# Patient Record
Sex: FEMALE | Race: WHITE
[De-identification: names, ages, dates, MRNs, and addresses within clinical notes are randomized per-mention and may not be internally consistent; named-entity substitution may affect disease eponyms.]

---

## 2019-08-02 ENCOUNTER — HOSPITAL ENCOUNTER (OUTPATIENT)
Age: 69
Discharge: HOME | End: 2019-08-02
Payer: MEDICARE

## 2019-08-02 DIAGNOSIS — Z53.9: Primary | ICD-10-CM

## 2020-05-13 ENCOUNTER — HOSPITAL ENCOUNTER (OUTPATIENT)
Dept: HOSPITAL 76 - EMS | Age: 70
Discharge: ANOTHER HOSPITAL | End: 2020-05-13
Attending: SURGERY
Payer: MEDICARE

## 2020-05-13 DIAGNOSIS — R51: Primary | ICD-10-CM

## 2020-05-13 DIAGNOSIS — H53.8: ICD-10-CM

## 2020-07-09 ENCOUNTER — HOSPITAL ENCOUNTER (OUTPATIENT)
Dept: HOSPITAL 76 - LAB.R | Age: 70
Discharge: HOME | End: 2020-07-09
Attending: NURSE PRACTITIONER
Payer: MEDICARE

## 2020-07-09 DIAGNOSIS — N39.0: Primary | ICD-10-CM

## 2020-07-09 PROCEDURE — 87181 SC STD AGAR DILUTION PER AGT: CPT

## 2020-07-09 PROCEDURE — 87077 CULTURE AEROBIC IDENTIFY: CPT

## 2020-07-09 PROCEDURE — 87086 URINE CULTURE/COLONY COUNT: CPT

## 2020-12-18 ENCOUNTER — HOSPITAL ENCOUNTER (OUTPATIENT)
Dept: HOSPITAL 76 - LAB.S | Age: 70
Discharge: HOME | End: 2020-12-18
Attending: EMERGENCY MEDICINE
Payer: MEDICARE

## 2020-12-18 DIAGNOSIS — R30.0: Primary | ICD-10-CM

## 2020-12-18 PROCEDURE — 87086 URINE CULTURE/COLONY COUNT: CPT

## 2020-12-24 ENCOUNTER — HOSPITAL ENCOUNTER (EMERGENCY)
Dept: HOSPITAL 76 - ED | Age: 70
Discharge: HOME | End: 2020-12-24
Payer: MEDICARE

## 2020-12-24 VITALS — SYSTOLIC BLOOD PRESSURE: 157 MMHG | DIASTOLIC BLOOD PRESSURE: 81 MMHG

## 2020-12-24 DIAGNOSIS — F17.200: ICD-10-CM

## 2020-12-24 DIAGNOSIS — I25.2: ICD-10-CM

## 2020-12-24 DIAGNOSIS — H54.3: Primary | ICD-10-CM

## 2020-12-24 DIAGNOSIS — I10: ICD-10-CM

## 2020-12-24 LAB
ANION GAP SERPL CALCULATED.4IONS-SCNC: 10 MMOL/L (ref 6–13)
BASOPHILS NFR BLD AUTO: 0 10^3/UL (ref 0–0.1)
BASOPHILS NFR BLD AUTO: 0.6 %
BUN SERPL-MCNC: 22 MG/DL (ref 6–20)
CALCIUM UR-MCNC: 9.3 MG/DL (ref 8.5–10.3)
CHLORIDE SERPL-SCNC: 100 MMOL/L (ref 101–111)
CO2 SERPL-SCNC: 26 MMOL/L (ref 21–32)
CREAT SERPLBLD-SCNC: 0.7 MG/DL (ref 0.4–1)
EOSINOPHIL # BLD AUTO: 0.1 10^3/UL (ref 0–0.7)
EOSINOPHIL NFR BLD AUTO: 2.6 %
ERYTHROCYTE [DISTWIDTH] IN BLOOD BY AUTOMATED COUNT: 13.7 % (ref 12–15)
GLUCOSE SERPL-MCNC: 98 MG/DL (ref 70–100)
HGB UR QL STRIP: 12 G/DL (ref 12–16)
LYMPHOCYTES # SPEC AUTO: 1.9 10^3/UL (ref 1.5–3.5)
LYMPHOCYTES NFR BLD AUTO: 34.2 %
MCH RBC QN AUTO: 33.6 PG (ref 27–31)
MCHC RBC AUTO-ENTMCNC: 33.1 G/DL (ref 32–36)
MCV RBC AUTO: 101.4 FL (ref 81–99)
MONOCYTES # BLD AUTO: 0.5 10^3/UL (ref 0–1)
MONOCYTES NFR BLD AUTO: 8.5 %
NEUTROPHILS # BLD AUTO: 2.9 10^3/UL (ref 1.5–6.6)
NEUTROPHILS # SNV AUTO: 5.4 X10^3/UL (ref 4.8–10.8)
NEUTROPHILS NFR BLD AUTO: 53.9 %
PDW BLD AUTO: 9.2 FL (ref 7.9–10.8)
PLATELET # BLD: 255 10^3/UL (ref 130–450)
RBC MAR: 3.57 10^6/UL (ref 4.2–5.4)
SODIUM SERPLBLD-SCNC: 136 MMOL/L (ref 135–145)

## 2020-12-24 PROCEDURE — 80048 BASIC METABOLIC PNL TOTAL CA: CPT

## 2020-12-24 PROCEDURE — 99284 EMERGENCY DEPT VISIT MOD MDM: CPT

## 2020-12-24 PROCEDURE — 85025 COMPLETE CBC W/AUTO DIFF WBC: CPT

## 2020-12-24 PROCEDURE — 36415 COLL VENOUS BLD VENIPUNCTURE: CPT

## 2020-12-24 PROCEDURE — 70496 CT ANGIOGRAPHY HEAD: CPT

## 2020-12-24 PROCEDURE — 93880 EXTRACRANIAL BILAT STUDY: CPT

## 2020-12-24 PROCEDURE — 99281 EMR DPT VST MAYX REQ PHY/QHP: CPT

## 2020-12-24 NOTE — CT REPORT
PROCEDURE:  ANGIO HEAD W/WO

 

INDICATIONS:  transient vision loss

 

CONTRAST:  IV CONTRAST: Optiray 320 ml: 80 PO CONTRAST: *NO PO CONTRAST 

 

TECHNIQUE:  

Precontrast 4.5 mm thick angled axial sections acquired from the foramen magnum to the vertex.   Afte
r the administration of intravenous contrast, 1 mm thick sections acquired through the [place]
is.  Postcontrast 4.5 mm thick sections then re-acquired from the foramen magnum to the vertex.  3-di
mensional maximum-intensity-projection (MIP) and/or volume rendering reformats were acquired of the c
entral intracranial vasculature.  For radiation dose reduction, the following was used:  automated ex
posure control, adjustment of mA and/or kV according to patient size.

 

COMPARISON:  CT angiogram of the head dated 5/13/2020.

 

FINDINGS:  

Image quality:  Excellent.  

 

Anterior circulation: The inferior intracranial internal carotid arteries are normal in size and flow
. Atheromatous calcifications are present bilaterally within the cavernous portions of the internal c
arotid arteries with resultant moderate stenosis.  The flow within the paired anterior cerebral arter
ies is normal and symmetric.  The flow within the middle cerebral arteries is normal and symmetric.  
The anterior communicating artery is not seen.  No aneurysms are seen.  

 

Posterior circulation:  Visualized portions of the vertebral arteries demonstrate normal caliber, and
 join to form a normal appearing basilar artery.  Flow within the posterior cerebral arteries is norm
al and symmetric.  No aneurysms are seen.  

 

CSF spaces:  Ventricles are normal in size and shape.  Basal cisterns are patent.  No extra-axial flu
id collections.  

 

Brain:  No midline shift. Encephalomalacia is redemonstrated within the right occipital lobe unchange
d from the CT dated 5/13/2020. No intracranial bleeds or masses.  Gray-white matter interface appears
 intact.  

 

Skull and face:  Calvarium and facial bones appear intact, without suspicious lesions.  

 

Sinuses:  Visualized sinuses and mastoids are clear.  

 

IMPRESSION:  

 

1. No acute intercranial findings.

2. Old right PCA distribution infarct, unchanged and the study dated 5/13/2020.

3. No new stenosis, occlusion, or aneurysm.

 

Reviewed by: Milagros Chang MD on 12/24/2020 2:57 PM PST

Approved by: Milagros Chang MD on 12/24/2020 2:57 PM PST

 

 

Station ID:  [id number]
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
## 2020-12-24 NOTE — ULTRASOUND REPORT
PROCEDURE:  Carotid Doppler Complete

 

INDICATIONS:  r/o carotid stenosis. possible TIA

 

TECHNIQUE:  

Color and pulse Doppler interrogation was performed of both carotid systems, with image documentation
 and velocity measurements.  

 

COMPARISON:  11/27/2018

 

FINDINGS:     

 

Right side:  

 

Common carotid artery peak systolic velocity:  71 cm/sec.  

Internal carotid artery peak systolic velocity:  99 cm/sec.  

Internal carotid artery end diastolic velocity:  32 cm/sec.  

External carotid artery peak systolic velocity:  94 cm/sec.  

ICA/CCA peak systolic ratio:  1.4 .  

Gray scale imaging description:  Intimal medial thickening of the common carotid artery. Mixed calcif
ied and noncalcified plaque at the right carotid bulb causing a minor subjective luminal ICA stenosis
. Mild spectral broadening of the distal waveform in the ICA.

Percent internal carotid artery stenosis:  Less than 50%. .  

Vertebral artery:  Flow direction is antegrade.  

 

Left side:  

 

Common carotid artery peak systolic velocity:  61 cm/sec.  

Internal carotid artery peak systolic velocity:  86 cm/sec.  

Internal carotid artery end diastolic velocity:  33 cm/sec.  

External carotid artery peak systolic velocity:  88 cm/sec.  

ICA/CCA peak systolic ratio:  1.4 .  

Gray scale imaging description:  Shadowing calcific plaque left carotid bulb causing mild to moderate
 subjective luminal stenosis and luminal irregularity. There is intimal medial thickening in the comm
on carotid artery. Calcific plaque seen in the proximal internal carotid artery causing mild subjecti
ve luminal narrowing. Vascular tortuosity results and waveform spectral broadening. 

Percent internal carotid artery stenosis:  Less than 50% .

Vertebral artery:  Flow direction is antegrade.  

 

IMPRESSION:  

 

1. Less than 50% internal carotid artery stenosis bilaterally based on waveform and velocity criteria
.

2. Moderate calcific plaque in the left carotid bulb and left ICA origin.

3. Bilateral intimal medial thickening of the common carotid arteries.

 

The estimate of stenosis included in the report of the imaging study was calculated using the NASCET 
method

 

Reviewed by: Brianna Andrews MD on 12/24/2020 5:00 PM PST

Approved by: Brianna Andrews MD on 12/24/2020 5:00 PM PST

 

 

Station ID:  [id number]

## 2020-12-28 ENCOUNTER — HOSPITAL ENCOUNTER (OUTPATIENT)
Dept: HOSPITAL 76 - LAB.N | Age: 70
Discharge: HOME | End: 2020-12-28
Attending: PHYSICIAN ASSISTANT
Payer: MEDICARE

## 2020-12-28 DIAGNOSIS — A09: Primary | ICD-10-CM

## 2020-12-28 DIAGNOSIS — Z20.828: ICD-10-CM

## 2021-01-04 ENCOUNTER — HOSPITAL ENCOUNTER (EMERGENCY)
Dept: HOSPITAL 76 - ED | Age: 71
Discharge: HOME | End: 2021-01-04
Payer: MEDICARE

## 2021-01-04 ENCOUNTER — HOSPITAL ENCOUNTER (OUTPATIENT)
Dept: HOSPITAL 76 - EMS | Age: 71
Discharge: ANOTHER HOSPITAL | End: 2021-01-04
Attending: SURGERY
Payer: MEDICARE

## 2021-01-04 VITALS — SYSTOLIC BLOOD PRESSURE: 119 MMHG | DIASTOLIC BLOOD PRESSURE: 55 MMHG

## 2021-01-04 DIAGNOSIS — J44.9: ICD-10-CM

## 2021-01-04 DIAGNOSIS — R42: Primary | ICD-10-CM

## 2021-01-04 DIAGNOSIS — H53.8: ICD-10-CM

## 2021-01-04 DIAGNOSIS — R11.0: ICD-10-CM

## 2021-01-04 DIAGNOSIS — I10: ICD-10-CM

## 2021-01-04 DIAGNOSIS — F17.200: ICD-10-CM

## 2021-01-04 DIAGNOSIS — R51.9: ICD-10-CM

## 2021-01-04 DIAGNOSIS — R41.0: Primary | ICD-10-CM

## 2021-01-04 DIAGNOSIS — Z86.73: ICD-10-CM

## 2021-01-04 LAB
ALBUMIN DIAFP-MCNC: 4 G/DL (ref 3.2–5.5)
ALBUMIN/GLOB SERPL: 1.3 {RATIO} (ref 1–2.2)
ALP SERPL-CCNC: 64 IU/L (ref 42–121)
ALT SERPL W P-5'-P-CCNC: 15 IU/L (ref 10–60)
AMPHET UR QL SCN: NEGATIVE
ANION GAP SERPL CALCULATED.4IONS-SCNC: 12 MMOL/L (ref 6–13)
AST SERPL W P-5'-P-CCNC: 20 IU/L (ref 10–42)
BASOPHILS NFR BLD AUTO: 0.1 10^3/UL (ref 0–0.1)
BASOPHILS NFR BLD AUTO: 0.4 %
BENZODIAZ UR QL SCN: NEGATIVE
BILIRUB BLD-MCNC: 0.7 MG/DL (ref 0.2–1)
BILIRUB UR QL CFM: NEGATIVE
BUN SERPL-MCNC: 13 MG/DL (ref 6–20)
CALCIUM UR-MCNC: 8.8 MG/DL (ref 8.5–10.3)
CASTS URNS QL MICRO: (no result) /LPF
CHLORIDE SERPL-SCNC: 95 MMOL/L (ref 101–111)
CLARITY UR REFRACT.AUTO: CLEAR
CO2 SERPL-SCNC: 24 MMOL/L (ref 21–32)
COCAINE UR-SCNC: NEGATIVE UMOL/L
CREAT SERPLBLD-SCNC: 0.7 MG/DL (ref 0.4–1)
EOSINOPHIL # BLD AUTO: 0.1 10^3/UL (ref 0–0.7)
EOSINOPHIL NFR BLD AUTO: 0.5 %
ERYTHROCYTE [DISTWIDTH] IN BLOOD BY AUTOMATED COUNT: 13.3 % (ref 12–15)
GLOBULIN SER-MCNC: 3.1 G/DL (ref 2.1–4.2)
GLUCOSE SERPL-MCNC: 110 MG/DL (ref 70–100)
GLUCOSE UR QL STRIP.AUTO: 100 MG/DL
HGB UR QL STRIP: 12.1 G/DL (ref 12–16)
KETONES UR QL STRIP.AUTO: (no result) MG/DL
LIPASE SERPL-CCNC: 32 U/L (ref 22–51)
LYMPHOCYTES # SPEC AUTO: 1.1 10^3/UL (ref 1.5–3.5)
LYMPHOCYTES NFR BLD AUTO: 8.8 %
MCH RBC QN AUTO: 34 PG (ref 27–31)
MCHC RBC AUTO-ENTMCNC: 34.3 G/DL (ref 32–36)
MCV RBC AUTO: 99.2 FL (ref 81–99)
METHADONE UR QL SCN: NEGATIVE
METHAMPHET UR QL SCN: NEGATIVE
MONOCYTES # BLD AUTO: 0.9 10^3/UL (ref 0–1)
MONOCYTES NFR BLD AUTO: 7.1 %
NEUTROPHILS # BLD AUTO: 10.4 10^3/UL (ref 1.5–6.6)
NEUTROPHILS # SNV AUTO: 12.6 X10^3/UL (ref 4.8–10.8)
NEUTROPHILS NFR BLD AUTO: 82.6 %
NITRITE UR QL STRIP.AUTO: NEGATIVE
OPIATES UR QL SCN: NEGATIVE
PDW BLD AUTO: 9.2 FL (ref 7.9–10.8)
PH UR STRIP.AUTO: 5.5 PH (ref 5–7.5)
PLATELET # BLD: 314 10^3/UL (ref 130–450)
PROT SPEC-MCNC: 7.1 G/DL (ref 6.7–8.2)
PROT UR STRIP.AUTO-MCNC: (no result) MG/DL
RBC # UR STRIP.AUTO: NEGATIVE /UL
RBC # URNS HPF: (no result) /HPF (ref 0–5)
RBC MAR: 3.56 10^6/UL (ref 4.2–5.4)
SODIUM SERPLBLD-SCNC: 131 MMOL/L (ref 135–145)
SP GR UR STRIP.AUTO: 1.02 (ref 1–1.03)
SQUAMOUS URNS QL MICRO: (no result)
UROBILINOGEN UR QL STRIP.AUTO: (no result) E.U./DL
UROBILINOGEN UR STRIP.AUTO-MCNC: NEGATIVE MG/DL
VOLATILE DRUGS POS SERPL SCN: (no result)

## 2021-01-04 PROCEDURE — 84484 ASSAY OF TROPONIN QUANT: CPT

## 2021-01-04 PROCEDURE — 36415 COLL VENOUS BLD VENIPUNCTURE: CPT

## 2021-01-04 PROCEDURE — 93005 ELECTROCARDIOGRAM TRACING: CPT

## 2021-01-04 PROCEDURE — 80320 DRUG SCREEN QUANTALCOHOLS: CPT

## 2021-01-04 PROCEDURE — 99284 EMERGENCY DEPT VISIT MOD MDM: CPT

## 2021-01-04 PROCEDURE — 99283 EMERGENCY DEPT VISIT LOW MDM: CPT

## 2021-01-04 PROCEDURE — 80053 COMPREHEN METABOLIC PANEL: CPT

## 2021-01-04 PROCEDURE — 96375 TX/PRO/DX INJ NEW DRUG ADDON: CPT

## 2021-01-04 PROCEDURE — 96374 THER/PROPH/DIAG INJ IV PUSH: CPT

## 2021-01-04 PROCEDURE — 71045 X-RAY EXAM CHEST 1 VIEW: CPT

## 2021-01-04 PROCEDURE — 80306 DRUG TEST PRSMV INSTRMNT: CPT

## 2021-01-04 PROCEDURE — 81001 URINALYSIS AUTO W/SCOPE: CPT

## 2021-01-04 PROCEDURE — 83690 ASSAY OF LIPASE: CPT

## 2021-01-04 PROCEDURE — 70450 CT HEAD/BRAIN W/O DYE: CPT

## 2021-01-04 PROCEDURE — 85025 COMPLETE CBC W/AUTO DIFF WBC: CPT
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
## 2021-01-04 NOTE — XRAY REPORT
PROCEDURE:  Chest 1 View X-Ray

 

INDICATIONS:  Chest Pain

 

TECHNIQUE:  One view of the chest was acquired.  

 

COMPARISON:  3/20/2020

 

FINDINGS:  

 

Surgical changes and devices:  None.  

 

Lungs and pleura:  No pleural effusions or pneumothorax.  Lungs are clear.  

 

Mediastinum:  Mediastinal contours appear normal.  Heart size is normal.  

 

Bones and chest wall:  No suspicious bony lesions.  Overlying soft tissues appear unremarkable.  

 

IMPRESSION:  

No acute cardiopulmonary pathology.

 

Reviewed by: Danilo Woodward MD on 1/4/2021 9:20 AM PST

Approved by: Danilo Woodward MD on 1/4/2021 9:20 AM [facility]

 

 

Station ID:  [id number]

## 2021-01-12 ENCOUNTER — HOSPITAL ENCOUNTER (OUTPATIENT)
Dept: HOSPITAL 76 - LAB.R | Age: 71
Discharge: HOME | End: 2021-01-12
Attending: FAMILY MEDICINE
Payer: MEDICARE

## 2021-01-12 DIAGNOSIS — R19.4: Primary | ICD-10-CM

## 2021-01-12 PROCEDURE — 87493 C DIFF AMPLIFIED PROBE: CPT

## 2021-01-12 PROCEDURE — 87427 SHIGA-LIKE TOXIN AG IA: CPT

## 2021-01-12 PROCEDURE — 87329 GIARDIA AG IA: CPT

## 2021-01-12 PROCEDURE — 87209 SMEAR COMPLEX STAIN: CPT

## 2021-01-12 PROCEDURE — 87177 OVA AND PARASITES SMEARS: CPT

## 2021-01-12 PROCEDURE — 87046 STOOL CULTR AEROBIC BACT EA: CPT

## 2021-01-12 PROCEDURE — 87338 HPYLORI STOOL AG IA: CPT

## 2021-01-12 PROCEDURE — 82274 ASSAY TEST FOR BLOOD FECAL: CPT

## 2021-01-12 PROCEDURE — 83993 ASSAY FOR CALPROTECTIN FECAL: CPT

## 2021-01-12 PROCEDURE — 81599 UNLISTED MAAA: CPT

## 2021-01-12 PROCEDURE — 87045 FECES CULTURE AEROBIC BACT: CPT

## 2021-03-07 ENCOUNTER — HOSPITAL ENCOUNTER (OUTPATIENT)
Dept: HOSPITAL 76 - LAB.N | Age: 71
Discharge: HOME | End: 2021-03-07
Attending: PHYSICIAN ASSISTANT
Payer: MEDICARE

## 2021-03-07 DIAGNOSIS — N39.0: Primary | ICD-10-CM

## 2021-03-07 PROCEDURE — 87077 CULTURE AEROBIC IDENTIFY: CPT

## 2021-03-07 PROCEDURE — 87086 URINE CULTURE/COLONY COUNT: CPT

## 2021-03-07 PROCEDURE — 87181 SC STD AGAR DILUTION PER AGT: CPT

## 2021-03-21 ENCOUNTER — HOSPITAL ENCOUNTER (OUTPATIENT)
Dept: HOSPITAL 76 - EMS | Age: 71
Discharge: ANOTHER HOSPITAL | End: 2021-03-21
Attending: EMERGENCY MEDICINE
Payer: MEDICARE

## 2021-03-21 ENCOUNTER — HOSPITAL ENCOUNTER (EMERGENCY)
Dept: HOSPITAL 76 - ED | Age: 71
Discharge: HOME | End: 2021-03-21
Payer: MEDICARE

## 2021-03-21 VITALS — DIASTOLIC BLOOD PRESSURE: 58 MMHG | SYSTOLIC BLOOD PRESSURE: 130 MMHG

## 2021-03-21 DIAGNOSIS — H53.8: ICD-10-CM

## 2021-03-21 DIAGNOSIS — R51.9: ICD-10-CM

## 2021-03-21 DIAGNOSIS — F17.200: ICD-10-CM

## 2021-03-21 DIAGNOSIS — R11.0: Primary | ICD-10-CM

## 2021-03-21 DIAGNOSIS — G40.89: Primary | ICD-10-CM

## 2021-03-21 LAB
ALBUMIN DIAFP-MCNC: 4 G/DL (ref 3.2–5.5)
ALBUMIN/GLOB SERPL: 1.7 {RATIO} (ref 1–2.2)
ALP SERPL-CCNC: 45 IU/L (ref 42–121)
ALT SERPL W P-5'-P-CCNC: 18 IU/L (ref 10–60)
AMPHET UR QL SCN: NEGATIVE
ANION GAP SERPL CALCULATED.4IONS-SCNC: 16 MMOL/L (ref 6–13)
AST SERPL W P-5'-P-CCNC: 26 IU/L (ref 10–42)
BARBITURATES UR QL SCN>300 NG/ML: NEGATIVE
BASOPHILS NFR BLD AUTO: 0 10^3/UL (ref 0–0.1)
BASOPHILS NFR BLD AUTO: 0.5 %
BENZODIAZ UR QL SCN: NEGATIVE
BILIRUB BLD-MCNC: 0.6 MG/DL (ref 0.2–1)
BUN SERPL-MCNC: 16 MG/DL (ref 6–20)
CALCIUM UR-MCNC: 8.9 MG/DL (ref 8.5–10.3)
CHLORIDE SERPL-SCNC: 97 MMOL/L (ref 101–111)
CLARITY UR REFRACT.AUTO: CLEAR
CO2 SERPL-SCNC: 21 MMOL/L (ref 21–32)
COCAINE UR-SCNC: NEGATIVE UMOL/L
CREAT SERPLBLD-SCNC: 0.9 MG/DL (ref 0.4–1)
EOSINOPHIL # BLD AUTO: 0.1 10^3/UL (ref 0–0.7)
EOSINOPHIL NFR BLD AUTO: 0.9 %
ERYTHROCYTE [DISTWIDTH] IN BLOOD BY AUTOMATED COUNT: 13.2 % (ref 12–15)
ETHANOL BLD-MCNC: < 5 MG/DL
GFRSERPLBLD MDRD-ARVRAT: 62 ML/MIN/{1.73_M2} (ref 89–?)
GLOBULIN SER-MCNC: 2.3 G/DL (ref 2.1–4.2)
GLUCOSE SERPL-MCNC: 133 MG/DL (ref 70–100)
GLUCOSE UR QL STRIP.AUTO: NEGATIVE MG/DL
HCT VFR BLD AUTO: 34.4 % (ref 37–47)
HGB UR QL STRIP: 11.7 G/DL (ref 12–16)
KETONES UR QL STRIP.AUTO: (no result) MG/DL
LIPASE SERPL-CCNC: 43 U/L (ref 22–51)
LYMPHOCYTES # SPEC AUTO: 1.2 10^3/UL (ref 1.5–3.5)
LYMPHOCYTES NFR BLD AUTO: 16.4 %
MCH RBC QN AUTO: 34.4 PG (ref 27–31)
MCHC RBC AUTO-ENTMCNC: 34 G/DL (ref 32–36)
MCV RBC AUTO: 101.2 FL (ref 81–99)
METHADONE UR QL SCN: NEGATIVE
METHAMPHET UR QL SCN: NEGATIVE
MONOCYTES # BLD AUTO: 0.5 10^3/UL (ref 0–1)
MONOCYTES NFR BLD AUTO: 6.6 %
NEUTROPHILS # BLD AUTO: 5.6 10^3/UL (ref 1.5–6.6)
NEUTROPHILS # SNV AUTO: 7.4 X10^3/UL (ref 4.8–10.8)
NEUTROPHILS NFR BLD AUTO: 75.5 %
NITRITE UR QL STRIP.AUTO: NEGATIVE
NRBC # BLD AUTO: 0 /100WBC
NRBC # BLD AUTO: 0 X10^3/UL
OPIATES UR QL SCN: NEGATIVE
PDW BLD AUTO: 8.5 FL (ref 7.9–10.8)
PH UR STRIP.AUTO: 5.5 PH (ref 5–7.5)
PLATELET # BLD: 241 10^3/UL (ref 130–450)
POTASSIUM SERPL-SCNC: 4.1 MMOL/L (ref 3.5–5)
PROT SPEC-MCNC: 6.3 G/DL (ref 6.7–8.2)
PROT UR STRIP.AUTO-MCNC: (no result) MG/DL
RBC # UR STRIP.AUTO: NEGATIVE /UL
RBC MAR: 3.4 10^6/UL (ref 4.2–5.4)
SODIUM SERPLBLD-SCNC: 134 MMOL/L (ref 135–145)
SP GR UR STRIP.AUTO: 1.02 (ref 1–1.03)
THC UR QL SCN: NEGATIVE
UROBILINOGEN UR QL STRIP.AUTO: (no result) E.U./DL
UROBILINOGEN UR STRIP.AUTO-MCNC: NEGATIVE MG/DL
VOLATILE DRUGS POS SERPL SCN: (no result)

## 2021-03-21 PROCEDURE — 36415 COLL VENOUS BLD VENIPUNCTURE: CPT

## 2021-03-21 PROCEDURE — 96365 THER/PROPH/DIAG IV INF INIT: CPT

## 2021-03-21 PROCEDURE — 81003 URINALYSIS AUTO W/O SCOPE: CPT

## 2021-03-21 PROCEDURE — 70450 CT HEAD/BRAIN W/O DYE: CPT

## 2021-03-21 PROCEDURE — 85025 COMPLETE CBC W/AUTO DIFF WBC: CPT

## 2021-03-21 PROCEDURE — 80320 DRUG SCREEN QUANTALCOHOLS: CPT

## 2021-03-21 PROCEDURE — 80306 DRUG TEST PRSMV INSTRMNT: CPT

## 2021-03-21 PROCEDURE — 81001 URINALYSIS AUTO W/SCOPE: CPT

## 2021-03-21 PROCEDURE — 99281 EMR DPT VST MAYX REQ PHY/QHP: CPT

## 2021-03-21 PROCEDURE — 99284 EMERGENCY DEPT VISIT MOD MDM: CPT

## 2021-03-21 PROCEDURE — 80053 COMPREHEN METABOLIC PANEL: CPT

## 2021-03-21 PROCEDURE — 83690 ASSAY OF LIPASE: CPT

## 2021-03-21 PROCEDURE — 87086 URINE CULTURE/COLONY COUNT: CPT

## 2021-03-21 NOTE — ED PHYSICIAN DOCUMENTATION
History of Present Illness





- Stated complaint


Stated Complaint: VISUAL CHANGES





- History obtained from


History obtained from: Patient, EMS





- History of Present Illness


Timing: Today


Pain level max: 6


Pain level now: 6





- Additonal information


Additional information: 





Patient is a 70-year-old female who presents to the emergency department with 

EMS today.  She reportedly was having flashing spots in her vision and a 

headache earlier.  This is typical for her.  She has been worked up for 

migraines versus seizures with neurology at the [facility]. She is not 

currently on any antiepileptics.  Does have encephalomalacia from a prior CVA.  

No recent medication changes.  She does smoke.  Unknown if she drinks alcohol or

not.  Patient was seizing upon arrival to the emergency department.  Seizure 

lasted about 2 to 3 minutes.  Full body tonic-clonic.  Bit her lip.





Review of Systems


Unable to obtain: Confused (post ictal)





PD PAST MEDICAL HISTORY





- Past Medical History


Respiratory: COPD


Neuro: CVA


Endocrine/Autoimmune: None


GI: Diverticulitis


GYN: None


: None


HEENT: Other


Psych: None


Musculoskeletal: None


Derm: None





- Past Surgical History


Past Surgical History: Yes


General: Appendectomy


/GYN:  section


HEENT: Cataracts, Tonsil/Adenoidectomy





- Present Medications


Home Medications: 


                                Ambulatory Orders











 Medication  Instructions  Recorded  Confirmed


 


Ipratropium/Albuterol [Duoneb] 1 puffs PO BID 18


 


Atorvastatin [Lipitor] 80 mg PO DAILY 20


 


Lisinopril [Prinivil] 10 mg PO BID 20


 


Metoprolol Succinate [Toprol Xl] 50 mg PO DAILY 20


 


Levetiracetam [Keppra] 500 mg PO BID #60 tablet 21 














- Allergies


Allergies/Adverse Reactions: 


                                    Allergies











Allergy/AdvReac Type Severity Reaction Status Date / Time


 


doxycycline Allergy  Unknown Verified 21 13:27


 


erythromycin base Allergy  Unknown Verified 21 13:27














- Social History


Does the pt smoke?: Yes


Smoking Status: Current every day smoker


Does the pt drink ETOH?: Yes


Does the pt have substance abuse?: No





- Immunizations


Immunizations are current?: Yes





- POLST


Patient has POLST: No





PD ED PE NORMAL





- Vitals


Vital signs reviewed: Yes





- General


General: No acute distress, Well developed/nourished, Other (post ictal, 

confused)





- HEENT


HEENT: Atraumatic, PERRL, Moist mucous membranes, Other (bit lower left lip, 

inner surface)





- Neck


Neck: Supple, no meningeal sign, No bony TTP





- Cardiac


Cardiac: RRR





- Respiratory


Respiratory: No respiratory distress, Clear bilaterally





- Abdomen


Abdomen: Soft, Non tender, Non distended





- Derm


Derm: Warm and dry





- Extremities


Extremities: No edema





- Neuro


Neuro: Other (post ictal, drowsy, but arousable and confused)





Results





- Vitals


Vitals: 


                               Vital Signs - 24 hr











  21





  13:19 14:49 16:09


 


Temperature 36.2 C L  


 


Heart Rate 110 H 77 84


 


Respiratory 16 17 16





Rate   


 


Blood Pressure 192/91 H 126/59 L 130/58 L


 


O2 Saturation 92 100 100














  21





  16:19


 


Temperature 


 


Heart Rate 82


 


Respiratory 17





Rate 


 


Blood Pressure 130/58 L


 


O2 Saturation 98








                                     Oxygen











O2 Source                      Room air

















- Labs


Labs: 


                                Laboratory Tests











  21





  13:25 14:06 15:40


 


WBC  7.4  


 


RBC  3.40 L  


 


Hgb  11.7 L  


 


Hct  34.4 L  


 


MCV  101.2 H  


 


MCH  34.4 H  


 


MCHC  34.0  


 


RDW  13.2  


 


Plt Count  241  


 


MPV  8.5  


 


Neut # (Auto)  5.6  


 


Lymph # (Auto)  1.2 L  


 


Mono # (Auto)  0.5  


 


Eos # (Auto)  0.1  


 


Baso # (Auto)  0.0  


 


Absolute Nucleated RBC  0.00  


 


Nucleated RBC %  0.0  


 


Sodium   134 L 


 


Potassium   4.1 


 


Chloride   97 L 


 


Carbon Dioxide   21 


 


Anion Gap   16.0 H 


 


BUN   16 


 


Creatinine   0.9 


 


Estimated GFR (MDRD)   62 L 


 


Glucose   133 H 


 


Calcium   8.9 


 


Total Bilirubin   0.6 


 


AST   26 


 


ALT   18 


 


Alkaline Phosphatase   45 


 


Total Protein   6.3 L 


 


Albumin   4.0 


 


Globulin   2.3 


 


Albumin/Globulin Ratio   1.7 


 


Lipase   43 


 


Urine Color    YELLOW


 


Urine Clarity    CLEAR


 


Urine pH    5.5


 


Ur Specific Gravity    1.025


 


Urine Protein    TRACE


 


Urine Glucose (UA)    NEGATIVE


 


Urine Ketones    TRACE


 


Urine Occult Blood    NEGATIVE


 


Urine Nitrite    NEGATIVE


 


Urine Bilirubin    NEGATIVE


 


Urine Urobilinogen    0.2 (NORMAL)


 


Ur Leukocyte Esterase    NEGATIVE


 


Ur Microscopic Review    NOT INDICATED


 


Urine Culture Comments    NOT INDICATED


 


Urine Opiates Screen    NEGATIVE


 


Ur Oxycodone Screen    NEGATIVE


 


Urine Methadone Screen    NEGATIVE


 


Ur Propoxyphene Screen    NEGATIVE


 


Ur Barbiturates Screen    NEGATIVE


 


Ur Tricyclics Screen    NEGATIVE


 


Ur Phencyclidine Scrn    NEGATIVE


 


Ur Amphetamine Screen    NEGATIVE


 


U Methamphetamines Scrn    NEGATIVE


 


U Benzodiazepines Scrn    NEGATIVE


 


Urine Cocaine Screen    NEGATIVE


 


U Cannabinoids Screen    NEGATIVE


 


Ethyl Alcohol   < 5.0 














- Rads (name of study)


  ** head CT


Radiology: Prelim report reviewed, EMP read contemporaneously, See rad report





PD MEDICAL DECISION MAKING





- ED course


Complexity details: reviewed old records, reviewed results, re-evaluated 

patient, considered differential, d/w patient


ED course: 





70-year-old female with a generalized tonic-clonic seizure today.  Sounds like 

she had an aura prior to the seizure.  This has happened to her before, but they

 were unclear if she was having seizures or not as it was not witnessed.  Given 

a gram of Keppra here.  Head CT does not show any acute abnormalities.  No 

significant findings on laboratory testing.  Discussed the case with her 

neurologist from the [facility], Dr. Jeff Hendrix, recommends Keppra 500 mg 

p.o. twice daily and follow-up in clinic.  Patient's mental status returned to 

her normal baseline.  No other injuries other than the bit lip.  Patient 

counseled regarding signs and symptoms for which I believe and urgent re-

evaluation would be necessary. Patient with good understanding of and agreement 

to plan and is comfortable going home at this time





This document was made in part using voice recognition software. While efforts 

are made to proofread this document, sound alike and grammatical errors may 

occur.








IMPRESSION: 


No acute intracranial abnormality. 





Stable encephalomalacia in the right occipital lobe from prior infarct. 





Departure





- Departure


Disposition: 01 Home, Self Care


Clinical Impression: 


 Seizure





Condition: Good


Instructions:  ED Seizure Recurrent


Follow-Up: 


Jeff Hendrix MD [Physician No Access] - 


Prescriptions: 


Levetiracetam [Keppra] 500 mg PO BID #60 tablet


Comments: 


Follow-up with Dr. Bhakta for further care.  Take the Keppra as prescribed.  You 

are not to drive or operate heavy machinery until cleared by neurology.  Do not 

swim or take baths either. You can shower


Discharge Date/Time: 21 16:58

## 2021-03-21 NOTE — CT REPORT
PROCEDURE:  HEAD WO

 

INDICATIONS:  seizure, h/o cva

 

TECHNIQUE:  

Noncontrast 4.5 mm thick angled axial sections acquired from the foramen magnum to the vertex.  For r
adiation dose reduction, the following was used:  automated exposure control, adjustment of mA and/or
 kV according to patient size.

 

COMPARISON:  Head CT 1/4/2021, 11/27/2018.

 

FINDINGS:  

Image quality:  Excellent.  

 

CSF spaces:  Basal cisterns are patent.  No extra-axial fluid collections.  Ventricles are normal in 
size and shape.  

 

Brain:  No midline shift.  No intracranial masses or hemorrhage. Encephalomalacia in the right occipi
sujey lobe is similar the prior exam. No new area of hypodensity in a vascular distribution.

 

Skull and face:  Calvarium and visualized facial bones are intact, without suspicious lesions.  

 

Sinuses:  Visualized sinuses and mastoids are clear.  

 

IMPRESSION:  

No acute intracranial abnormality.

 

Stable encephalomalacia in the right occipital lobe from prior infarct.

 

Reviewed by: Yariel Estes MD on 3/21/2021 12:51 PM GURPREET

Approved by: Yariel Estes MD on 3/21/2021 12:51 PM GURPREET

 

 

Station ID:  [id number]

## 2021-03-28 ENCOUNTER — HOSPITAL ENCOUNTER (EMERGENCY)
Dept: HOSPITAL 76 - ED | Age: 71
Discharge: HOME | End: 2021-03-28
Payer: MEDICARE

## 2021-03-28 VITALS — SYSTOLIC BLOOD PRESSURE: 137 MMHG | DIASTOLIC BLOOD PRESSURE: 82 MMHG

## 2021-03-28 DIAGNOSIS — J44.1: Primary | ICD-10-CM

## 2021-03-28 DIAGNOSIS — F17.200: ICD-10-CM

## 2021-03-28 PROCEDURE — 99283 EMERGENCY DEPT VISIT LOW MDM: CPT

## 2021-03-28 PROCEDURE — 94640 AIRWAY INHALATION TREATMENT: CPT

## 2021-03-28 RX ADMIN — IPRATROPIUM BROMIDE AND ALBUTEROL SULFATE STA ML: 2.5; .5 SOLUTION RESPIRATORY (INHALATION) at 12:50

## 2021-03-28 RX ADMIN — IPRATROPIUM BROMIDE AND ALBUTEROL SULFATE STA ML: 2.5; .5 SOLUTION RESPIRATORY (INHALATION) at 12:57

## 2021-03-28 NOTE — ED PHYSICIAN DOCUMENTATION
PD HPI DYSPNEA





- Stated complaint


Stated Complaint: SOA





- Chief complaint


Chief Complaint: Resp





- History obtained from


History obtained from: Patient





- Additional information


Additional information: 





70-year-old woman with COPD presents because she ran out of her nebulizer 

solution and is feeling short of breath.  No increase in her chronic cough or 

production.  No fevers.  No chest pain.  No pedal edema or calf pain.


She tried calling her doctor's office for refill, but they were not open.





PD PAST MEDICAL HISTORY





- Past Medical History


Respiratory: COPD


Neuro: CVA


Endocrine/Autoimmune: None


GI: Diverticulitis


GYN: None


: None


HEENT: Other


Psych: None


Musculoskeletal: None


Derm: None





- Past Surgical History


Past Surgical History: Yes


General: Appendectomy


/GYN:  section


HEENT: Cataracts, Tonsil/Adenoidectomy





- Present Medications


Home Medications: 


                                Ambulatory Orders











 Medication  Instructions  Recorded  Confirmed


 


Ipratropium/Albuterol [Duoneb] 1 puffs PO BID 18


 


Atorvastatin [Lipitor] 80 mg PO DAILY 20


 


Lisinopril [Prinivil] 10 mg PO BID 20


 


Metoprolol Succinate [Toprol Xl] 50 mg PO DAILY 20


 


Levetiracetam [Keppra] 500 mg PO BID #60 tablet 21


 


Ipratropium/Albuterol [Duoneb] 3 ml INH Q6H PRN #30 unit 21 














- Allergies


Allergies/Adverse Reactions: 


                                    Allergies











Allergy/AdvReac Type Severity Reaction Status Date / Time


 


doxycycline Allergy  Unknown Verified 21 12:23


 


erythromycin base Allergy  Unknown Verified 21 12:23














- Social History


Does the pt smoke?: Yes


Smoking Status: Current every day smoker


Does the pt drink ETOH?: Yes


Does the pt have substance abuse?: No





- Immunizations


Immunizations are current?: Yes





- POLST


Patient has POLST: No





PD ED PE NORMAL





- Vitals


Vital signs reviewed: Yes





- General


General: Alert and oriented X 3, No acute distress





- HEENT


HEENT: PERRL, EOMI





- Neck


Neck: Supple, no meningeal sign, No bony TTP





- Cardiac


Cardiac: RRR, No murmur





- Respiratory


Respiratory: Other (No respiratory distress, speaking in full sentences, 

moderate expiratory wheezing, no focal findings.)





- Derm


Derm: No rash





- Extremities


Extremities: No edema, No calf tenderness / cord





- Neuro


Neuro: Alert and oriented X 3, Normal speech





Results





- Vitals


Vitals: 


                               Vital Signs - 24 hr











  21





  12:22 12:27


 


Temperature 36 C L 36 C L


 


Heart Rate 95 95


 


Respiratory 22 22





Rate  


 


Blood Pressure 154/93 H 154/93 H


 


O2 Saturation 99 99








                                     Oxygen











O2 Source                      Room air

















PD MEDICAL DECISION MAKING





- ED course


ED course: 





DuoNeb here, she declined steroids.  The history and physical are typical for 

COPD and I do not think further work-up is necessary.





Departure





- Departure


Disposition: 01 Home, Self Care


Clinical Impression: 


 COPD exacerbation





Condition: Good


Record reviewed to determine appropriate education?: Yes


Instructions:  COPD Dc


Prescriptions: 


Ipratropium/Albuterol [Duoneb] 3 ml INH Q6H PRN #30 unit


 PRN Reason: Dyspnea


Comments: 


Prescription was sent electronically to [facility] in [place].  Return if 

worsening.  Follow-up with your doctor this coming week.

## 2021-05-11 ENCOUNTER — HOSPITAL ENCOUNTER (OUTPATIENT)
Dept: HOSPITAL 76 - DI | Age: 71
Discharge: HOME | End: 2021-05-11
Attending: NURSE PRACTITIONER
Payer: MEDICARE

## 2021-05-11 DIAGNOSIS — F17.210: ICD-10-CM

## 2021-05-11 DIAGNOSIS — Z80.41: ICD-10-CM

## 2021-05-11 DIAGNOSIS — Z12.2: Primary | ICD-10-CM

## 2021-05-11 DIAGNOSIS — Z12.31: Primary | ICD-10-CM

## 2021-05-12 NOTE — CT REPORT
PROCEDURE:  Low Dose Lung Cancer Screen

 

INDICATIONS:  HISTORY OF SMOKING

 

TECHNIQUE:  

Noncontrast low-dose 5 mm thick sections acquired from the pulmonary apices to the posterior costophr
enic angles.  7 mm thick coronal and sagittal MIP reformats were then acquired.  For radiation dose r
eduction, the following was used:  automated exposure control, adjustment of mA and/or kV according t
o patient size.

 

COMPARISON:  None.

 

FINDINGS:  

Image quality:  Excellent.  

 

Lungs and pleura:  Pulmonary hyperexpansion is present with flattening of the diaphragms and there is
 a pattern of moderate centrilobular emphysema. Additionally, scattered within the lung parenchyma bi
laterally are small areas of what appears to be alveolar scarring related to prior inflammatory event
s. This is also seen at the lung apices with contiguous pleural and lung parenchymal scarring. No mas
s lesion is found that would indicate likelihood of early development of lung carcinoma.

 

Mediastinum:  Heart size is normal and there is moderate calcific plaquing involving the coronary art
eries..  No pericardial effusion.  No mediastinal adenopathy by size criteria.  Thoracic aorta and ce
ntral pulmonary arteries are normal in size.  Esophagus is normal in caliber.  No hiatal hernia.  

 

Bones and chest wall:  No suspicious bony lesions.  No vertebral body compression fractures.  No axil
ryder or supraclavicular adenopathy by size criteria. 

 

Abdomen:  Visualized upper abdomen solid organs and bowel loops appear normal in the absence of contr
ast.  

 

IMPRESSION:  

Scattered areas of lung parenchymal scarring, no lesion is seen that would indicate likelihood of ear
ly manifestation of lung carcinoma.

 

Lung RADS category 2, follow-up noncontrast low-dose CT scanning is recommended in 1 year.

 

Reviewed by: Onur Avery MD on 5/12/2021 9:45 AM PDT

Approved by: Onur Avery MD on 5/12/2021 9:45 AM PDT

 

 

Station ID:  [id number]

## 2021-05-13 NOTE — MAMMOGRAPHY REPORT
BILATERAL DIGITAL SCREENING MAMMOGRAM 3D/2D: 5/11/2021

 

CLINICAL: Routine screening.  

 

Comparison is made to exam dated:  11/18/2008 mammogram - [facility].  There are sc
attered fibroglandular elements in both breasts.  

 

No significant masses, calcifications, or other findings are seen in either breast.  

There has been no significant interval change.

 

IMPRESSION: NEGATIVE

There is no mammographic evidence of malignancy. A 1 year screening mammogram is recommended.  

 

 

This exam was interpreted at Station ID: [id number].  

 

NOTE: For mammograms, a report in lay terms will be sent to the patient. Approximately 15% of breast 
malignancies will not be visualized mammographically. In the management of a palpable breast mass, a 
negative mammogram must not discourage biopsy of a clinically suspicious lesion.

 

Electronically Signed By: Garry Hooks M.D.          

ddp/penrad:5/13/2021 07:43:00  

 

 

 

ACR BI-RADS Category 1: Negative [id number]

PARENCHYMAL PATTERN: (A) - The breast(s) demonstrate(s) scattered fibroglandular densities.

BI-RADS CATEGORY: (1) - 1

RECOMMENDATION: (ANNUAL)  - Recommend routine annual screening mammography.

[id number]

1 year screening

LATERALITY: (B)

## 2021-08-21 ENCOUNTER — HOSPITAL ENCOUNTER (OUTPATIENT)
Dept: HOSPITAL 76 - LAB.N | Age: 71
Discharge: HOME | End: 2021-08-21
Attending: PHYSICIAN ASSISTANT
Payer: MEDICARE

## 2021-08-21 DIAGNOSIS — N39.0: Primary | ICD-10-CM

## 2021-08-21 PROCEDURE — 87086 URINE CULTURE/COLONY COUNT: CPT

## 2021-08-29 ENCOUNTER — HOSPITAL ENCOUNTER (OUTPATIENT)
Dept: HOSPITAL 76 - EMS | Age: 71
Discharge: ANOTHER HOSPITAL | End: 2021-08-29
Payer: MEDICARE

## 2021-08-29 ENCOUNTER — HOSPITAL ENCOUNTER (EMERGENCY)
Dept: HOSPITAL 76 - ED | Age: 71
Discharge: ANOTHER HOSPITAL | End: 2021-08-29
Payer: MEDICARE

## 2021-08-29 VITALS — SYSTOLIC BLOOD PRESSURE: 133 MMHG | DIASTOLIC BLOOD PRESSURE: 64 MMHG

## 2021-08-29 DIAGNOSIS — F17.200: ICD-10-CM

## 2021-08-29 DIAGNOSIS — Z20.822: ICD-10-CM

## 2021-08-29 DIAGNOSIS — I69.812: ICD-10-CM

## 2021-08-29 DIAGNOSIS — Z79.82: ICD-10-CM

## 2021-08-29 DIAGNOSIS — I10: ICD-10-CM

## 2021-08-29 DIAGNOSIS — R53.1: Primary | ICD-10-CM

## 2021-08-29 DIAGNOSIS — I62.00: Primary | ICD-10-CM

## 2021-08-29 LAB
ALBUMIN DIAFP-MCNC: 4.1 G/DL (ref 3.2–5.5)
ALBUMIN/GLOB SERPL: 1.4 {RATIO} (ref 1–2.2)
ALP SERPL-CCNC: 82 IU/L (ref 42–121)
ALT SERPL W P-5'-P-CCNC: 16 IU/L (ref 10–60)
ANION GAP SERPL CALCULATED.4IONS-SCNC: 13 MMOL/L (ref 6–13)
AST SERPL W P-5'-P-CCNC: 21 IU/L (ref 10–42)
B PARAPERT DNA SPEC QL NAA+PROBE: NOT DETECTED
B PERT DNA SPEC QL NAA+PROBE: NOT DETECTED
BASOPHILS NFR BLD AUTO: 0.1 10^3/UL (ref 0–0.1)
BASOPHILS NFR BLD AUTO: 0.8 %
BILIRUB BLD-MCNC: 0.9 MG/DL (ref 0.2–1)
BUN SERPL-MCNC: 8 MG/DL (ref 6–20)
C PNEUM DNA NPH QL NAA+NON-PROBE: NOT DETECTED
CALCIUM UR-MCNC: 9.1 MG/DL (ref 8.5–10.3)
CHLORIDE SERPL-SCNC: 97 MMOL/L (ref 101–111)
CLARITY UR REFRACT.AUTO: CLEAR
CO2 SERPL-SCNC: 25 MMOL/L (ref 21–32)
CREAT SERPLBLD-SCNC: 0.6 MG/DL (ref 0.4–1)
EOSINOPHIL # BLD AUTO: 0.2 10^3/UL (ref 0–0.7)
EOSINOPHIL NFR BLD AUTO: 2.9 %
ERYTHROCYTE [DISTWIDTH] IN BLOOD BY AUTOMATED COUNT: 12.7 % (ref 12–15)
ETHANOL BLD-MCNC: 99.1 MG/DL
FLUAV RNA RESP QL NAA+PROBE: NOT DETECTED
GFRSERPLBLD MDRD-ARVRAT: 99 ML/MIN/{1.73_M2} (ref 89–?)
GLOBULIN SER-MCNC: 2.9 G/DL (ref 2.1–4.2)
GLUCOSE SERPL-MCNC: 93 MG/DL (ref 70–100)
GLUCOSE UR QL STRIP.AUTO: NEGATIVE MG/DL
HAEM INFLU B DNA SPEC QL NAA+PROBE: NOT DETECTED
HCOV 229E RNA SPEC QL NAA+PROBE: NOT DETECTED
HCOV HKU1 RNA UPPER RESP QL NAA+PROBE: NOT DETECTED
HCOV NL63 RNA ASPIRATE QL NAA+PROBE: NOT DETECTED
HCOV OC43 RNA SPEC QL NAA+PROBE: NOT DETECTED
HCT VFR BLD AUTO: 36.5 % (ref 37–47)
HGB UR QL STRIP: 12.1 G/DL (ref 12–16)
HMPV AG SPEC QL: NOT DETECTED
HPIV1 RNA NPH QL NAA+PROBE: NOT DETECTED
HPIV2 SPEC QL CULT: NOT DETECTED
HPIV3 AB TITR SER CF: NOT DETECTED {TITER}
HPIV4 RNA SPEC QL NAA+PROBE: NOT DETECTED
INR PPP: 1 (ref 0.8–1.2)
KETONES UR QL STRIP.AUTO: NEGATIVE MG/DL
LIPASE SERPL-CCNC: 43 U/L (ref 22–51)
LYMPHOCYTES # SPEC AUTO: 2.3 10^3/UL (ref 1.5–3.5)
LYMPHOCYTES NFR BLD AUTO: 28.9 %
M PNEUMO DNA SPEC QL NAA+PROBE: NOT DETECTED
MAGNESIUM SERPL-MCNC: 2.1 MG/DL (ref 1.7–2.8)
MCH RBC QN AUTO: 34.3 PG (ref 27–31)
MCHC RBC AUTO-ENTMCNC: 33.2 G/DL (ref 32–36)
MCV RBC AUTO: 103.4 FL (ref 81–99)
MONOCYTES # BLD AUTO: 0.7 10^3/UL (ref 0–1)
MONOCYTES NFR BLD AUTO: 8.8 %
NEUTROPHILS # BLD AUTO: 4.6 10^3/UL (ref 1.5–6.6)
NEUTROPHILS # SNV AUTO: 7.9 X10^3/UL (ref 4.8–10.8)
NEUTROPHILS NFR BLD AUTO: 58.3 %
NITRITE UR QL STRIP.AUTO: NEGATIVE
NRBC # BLD AUTO: 0 /100WBC
NRBC # BLD AUTO: 0 X10^3/UL
PDW BLD AUTO: 9 FL (ref 7.9–10.8)
PH UR STRIP.AUTO: 6 PH (ref 5–7.5)
PLATELET # BLD: 359 10^3/UL (ref 130–450)
POTASSIUM SERPL-SCNC: 3.9 MMOL/L (ref 3.5–5)
PROT SPEC-MCNC: 7 G/DL (ref 6.7–8.2)
PROT UR STRIP.AUTO-MCNC: NEGATIVE MG/DL
PROTHROM ACT/NOR PPP: 11 SECS (ref 9.9–12.6)
RBC # UR STRIP.AUTO: NEGATIVE /UL
RBC # URNS HPF: (no result) /HPF (ref 0–5)
RBC MAR: 3.53 10^6/UL (ref 4.2–5.4)
RSV RNA RESP QL NAA+PROBE: NOT DETECTED
RV+EV RNA SPEC QL NAA+PROBE: NOT DETECTED
SARS-COV-2 RNA PNL SPEC NAA+PROBE: NOT DETECTED
SODIUM SERPLBLD-SCNC: 135 MMOL/L (ref 135–145)
SP GR UR STRIP.AUTO: <=1.005 (ref 1–1.03)
SQUAMOUS URNS QL MICRO: (no result)
UROBILINOGEN UR QL STRIP.AUTO: (no result) E.U./DL
UROBILINOGEN UR STRIP.AUTO-MCNC: NEGATIVE MG/DL
WBC # UR MANUAL: (no result) /HPF (ref 0–5)

## 2021-08-29 PROCEDURE — 99284 EMERGENCY DEPT VISIT MOD MDM: CPT

## 2021-08-29 PROCEDURE — 87631 RESP VIRUS 3-5 TARGETS: CPT

## 2021-08-29 PROCEDURE — 85610 PROTHROMBIN TIME: CPT

## 2021-08-29 PROCEDURE — 70450 CT HEAD/BRAIN W/O DYE: CPT

## 2021-08-29 PROCEDURE — 96365 THER/PROPH/DIAG IV INF INIT: CPT

## 2021-08-29 PROCEDURE — 85025 COMPLETE CBC W/AUTO DIFF WBC: CPT

## 2021-08-29 PROCEDURE — 81001 URINALYSIS AUTO W/SCOPE: CPT

## 2021-08-29 PROCEDURE — 96361 HYDRATE IV INFUSION ADD-ON: CPT

## 2021-08-29 PROCEDURE — 99285 EMERGENCY DEPT VISIT HI MDM: CPT

## 2021-08-29 PROCEDURE — 80053 COMPREHEN METABOLIC PANEL: CPT

## 2021-08-29 PROCEDURE — 87086 URINE CULTURE/COLONY COUNT: CPT

## 2021-08-29 PROCEDURE — 71046 X-RAY EXAM CHEST 2 VIEWS: CPT

## 2021-08-29 PROCEDURE — 36415 COLL VENOUS BLD VENIPUNCTURE: CPT

## 2021-08-29 PROCEDURE — 83735 ASSAY OF MAGNESIUM: CPT

## 2021-08-29 PROCEDURE — 81003 URINALYSIS AUTO W/O SCOPE: CPT

## 2021-08-29 PROCEDURE — 0202U NFCT DS 22 TRGT SARS-COV-2: CPT

## 2021-08-29 PROCEDURE — 83690 ASSAY OF LIPASE: CPT

## 2021-08-29 PROCEDURE — 80320 DRUG SCREEN QUANTALCOHOLS: CPT

## 2021-08-29 NOTE — XRAY REPORT
PROCEDURE:  Chest 2 View X-Ray

 

INDICATIONS:  generalized weakness

 

TECHNIQUE:  2 view(s) of the chest.  

 

COMPARISON:  Chest x-ray 3/20/2020

 

FINDINGS:  

 

Surgical changes and devices:  None.  

 

Lungs and pleura: Lungs are hyperinflated. There is overlapping shadows at the left lung base. No con
solidations.

 

Mediastinum:  Mediastinal contours are normal.  Heart size is normal.  

 

Bones and chest wall: As noted above, overlapping soft tissue densities are noted at the right base. 
There is a poorly visualized inferior lateral left rib fracture, with the possibility of an adjacent 
additional rib fracture. Soft tissues appear unremarkable.  

 

IMPRESSION:  Incompletely evaluated left base secondary to what appears to be overlapping shadows. In
 addition, inferior lateral left rib fracture, possibly an adjacent second rib fracture also appears 
to be present, obscured by overlapping shadows. Further evaluation with repeat x-ray or CT chest is r
ecommended to exclude presence of pneumothorax at the base given incomplete evaluation in presence of
 rib fractures.

 

Reviewed by: Orquidea Cartwright MD on 8/29/2021 10:24 PM PDT

Approved by: Orquidea Cartwright MD on 8/29/2021 10:24 PM PDT

 

 

Station ID:  [id number]

## 2021-08-29 NOTE — CT REPORT
PROCEDURE:  HEAD WO

 

INDICATIONS:  BLE weakness

 

TECHNIQUE:  

Noncontrast 4.5 mm thick angled axial sections acquired from the foramen magnum to the vertex.  For r
adiation dose reduction, the following was used:  automated exposure control, adjustment of mA and/or
 kV according to patient size.

 

COMPARISON:  None.

 

FINDINGS:  

Image quality:  Excellent.  

 

Brain: There is an isodense holohemispheric right extra-axial fluid collection measuring approximatel
y 4.4 cm in greatest transverse dimension. It is causing right to left midline shift measuring approx
imately 1.2 cm. There is marked effacement and compression of the right lateral ventricle as well as 
the posterior right temporal horn. Third ventricle is also compressed. Gray-white matter interface is
 normal.  

 

Skull and face:  Calvarium and visualized facial bones are intact, without suspicious lesions.  

 

Sinuses:  Visualized sinuses and mastoids are clear.  

 

IMPRESSION:  

 

 

1. Holohemispheric right isodense extra-axial fluid collection with midline shift most consistent wit
h subacute subdural hematoma.

 

 

 

Findings were discussed with Dr. Cohn on 11/29/2021 at 10:18 PM.

 

Reviewed by: Orquidea Cartwright MD on 8/29/2021 10:20 PM PDT

Approved by: Orquidea Cartwright MD on 8/29/2021 10:20 PM PDT

 

 

Station ID:  [id number]

## 2021-08-29 NOTE — ED PHYSICIAN DOCUMENTATION
History of Present Illness





- Stated complaint


Stated Complaint: GEN WEAKNESS





- Chief complaint


Chief Complaint: Neuro





- History obtained from


History obtained from: Patient





- History of Present Illness


Timing: How many weeks ago (1)


Pain level max: 0


Pain level now: 0


Improved by: rest


Worsened by: no exacerbating factors, but weakness is more noticable when she 

tries to ambulate





- Additonal information


Additional information: 





patient c/o approximately 1 week of generalized weakness, mostly BLE weakness 

with associated increasing difficulty in standing and ambulating. She says she 

has never had this problem before but that over the past week she has started 

using a walker and has to focus her effort on getting her legs to move as she 

wants them to and to support her weight. She denies injury. She says she was 

evaluated by her PMD several days ago for this, no testing performed at that 

time. She also says she was seen at a walk-in clinic approximately 1 week ago 

and was diagnosed with a UTI and completed an antibiotic for this





Review of Systems


Constitutional: reports: Reviewed and negative


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

CVA; no new visual problems)


Ears: reports: Reviewed and negative


Nose: reports: Reviewed and negative


Throat: reports: Reviewed and negative


Cardiac: reports: Reviewed and negative


Respiratory: reports: Reviewed and negative


GI: reports: Reviewed and negative


: denies: Dysuria, Frequency, Incontinent


Skin: reports: Reviewed and negative


Musculoskeletal: reports: Reviewed and negative


Neurologic: reports: Generalized weakness (predominantly BLE).  denies: 

Numbness, Confused, Altered mental status, Headache, Head injury





PD PAST MEDICAL HISTORY





- Past Medical History


Cardiovascular: Hypertension, High cholesterol


Respiratory: COPD


Neuro: CVA





- Present Medications


Home Medications: 


                                Ambulatory Orders











 Medication  Instructions  Recorded  Confirmed


 


Ipratropium/Albuterol [Duoneb] 1 puffs PO BID 11/27/18 03/28/21


 


Atorvastatin [Lipitor] 80 mg PO DAILY 12/24/20 03/28/21


 


Metoprolol Succinate [Toprol Xl] 50 mg PO DAILY 12/24/20 03/28/21


 


lisinopriL [Prinivil] 10 mg PO BID 12/24/20 03/28/21


 


Levetiracetam [Keppra] 500 mg PO BID #60 tablet 03/21/21 03/28/21


 


Ipratropium/Albuterol [Duoneb] 3 ml INH Q6H PRN #30 unit 03/28/21 


 


Aspirin [[place] Aspirin] 1 tab DAILY 08/29/21 08/29/21


 


Atorvastatin Calcium [Lipitor] 1 tab DAILY 08/29/21 08/29/21


 


Levetiracetam [Keppra] 0.5 tab BID 08/29/21 08/29/21


 


Lisinopril [Zestril] 1 tab BID 08/29/21 08/29/21


 


Metoprolol Succinate [Toprol Xl] 1 tab DAILY 08/29/21 08/29/21














- Allergies


Allergies/Adverse Reactions: 


                                    Allergies











Allergy/AdvReac Type Severity Reaction Status Date / Time


 


doxycycline Allergy  Unknown Verified 08/30/21 08:14


 


erythromycin base Allergy  Unknown Verified 08/30/21 08:14














- Social History


Does the pt smoke?: Yes


Smoking Status: Current every day smoker


Does the pt drink ETOH?: Yes


ETOH Use: Wine


Does the pt have substance abuse?: No





PD ED PE NORMAL





- Vitals


Vital signs reviewed: Yes





- General


General: Alert and oriented X 3, No acute distress, Well developed/nourished





- HEENT


HEENT: Atraumatic, PERRL, EOMI, Moist mucous membranes





- Neck


Neck: No bony TTP





- Cardiac


Cardiac: RRR, No murmur





- Respiratory


Respiratory: No respiratory distress, Clear bilaterally





- Abdomen


Abdomen: Soft, Non tender





- Back


Back: No CVA TTP, No spinal TTP





- Derm


Derm: Normal color, Warm and dry, Other (scattered BLE echymoses)





- Extremities


Extremities: No deformity, No tenderness to palpate, Normal ROM s pain, No edema





- Neuro


Neuro: Alert and oriented X 3, CNs 2-12 intact, No motor deficit, No sensory 

deficit, Normal speech


Eye Opening: Spontaneous


Motor: Obeys Commands


Verbal: Oriented


GCS Score: 15





Results





- Vitals


Vitals: 


                               Vital Signs - 24 hr











  08/29/21 08/29/21 08/29/21





  20:10 20:54 22:20


 


Temperature 36.5 C  


 


Heart Rate 84 82 65


 


Respiratory 24 16 19





Rate   


 


Blood Pressure 138/81 H 126/77 153/95 H


 


O2 Saturation 100 99 96














  08/29/21





  23:28


 


Temperature 


 


Heart Rate 82


 


Respiratory 20





Rate 


 


Blood Pressure 133/64 H


 


O2 Saturation 98








                                     Oxygen











O2 Source                      Room air

















- Labs


Labs: 


                                  Microbiology











 08/29/21 21:38 Urine Culture - Preliminary





 Urine,Clean Catch    CULTURE IN PROGRESS. RESULTS TO FOLLOW.








                                Laboratory Tests











  08/29/21 08/29/21 08/29/21





  20:33 20:33 20:44


 


WBC  7.9  


 


RBC  3.53 L  


 


Hgb  12.1  


 


Hct  36.5 L  


 


MCV  103.4 H  


 


MCH  34.3 H  


 


MCHC  33.2  


 


RDW  12.7  


 


Plt Count  359  


 


MPV  9.0  


 


Neut # (Auto)  4.6  


 


Lymph # (Auto)  2.3  


 


Mono # (Auto)  0.7  


 


Eos # (Auto)  0.2  


 


Baso # (Auto)  0.1  


 


Absolute Nucleated RBC  0.00  


 


Nucleated RBC %  0.0  


 


PT    11.0


 


INR    1.0


 


Sodium   135 


 


Potassium   3.9 


 


Chloride   97 L 


 


Carbon Dioxide   25 


 


Anion Gap   13.0 


 


BUN   8 


 


Creatinine   0.6 


 


Estimated GFR (MDRD)   99 


 


Glucose   93 


 


Calcium   9.1 


 


Magnesium   2.1 


 


Total Bilirubin   0.9 


 


AST   21 


 


ALT   16 


 


Alkaline Phosphatase   82 


 


Total Protein   7.0 


 


Albumin   4.1 


 


Globulin   2.9 


 


Albumin/Globulin Ratio   1.4 


 


Lipase   43 


 


Urine Color   


 


Urine Clarity   


 


Urine pH   


 


Ur Specific Gravity   


 


Urine Protein   


 


Urine Glucose (UA)   


 


Urine Ketones   


 


Urine Occult Blood   


 


Urine Nitrite   


 


Urine Bilirubin   


 


Urine Urobilinogen   


 


Ur Leukocyte Esterase   


 


Urine RBC   


 


Urine WBC   


 


Ur Squamous Epith Cells   


 


Urine Bacteria   


 


Ur Microscopic Review   


 


Urine Culture Comments   


 


Nasal Adenovirus (PCR)   


 


Nasal B. parapertussis DNA (PCR)   


 


Nasal Coronavir 229E PCR   


 


Nasal Coronavir HKU1 PCR   


 


Nasal Coronavir NL63 PCR   


 


Nasal Coronavir OC43 PCR   


 


Nasal Enterovir/Rhinovir PCR   


 


Nasal Influenza B PCR   


 


Nasal Influenza A PCR   


 


Nasal Parainfluen 1 PCR   


 


Nasal Parainfluen 2 PCR   


 


Nasal Parainfluen 3 PCR   


 


Nasal Parainfluen 4 PCR   


 


Nasal RSV (PCR)   


 


Nasal B.pertussis DNA PCR   


 


Nasal C.pneumoniae (PCR)   


 


Eddie Human Metapneumo PCR   


 


Nasal M.pneumoniae (PCR)   


 


Nasal SARS-CoV-2 (PCR)   


 


Ethyl Alcohol   99.1 














  08/29/21 08/29/21





  21:38 22:38


 


WBC  


 


RBC  


 


Hgb  


 


Hct  


 


MCV  


 


MCH  


 


MCHC  


 


RDW  


 


Plt Count  


 


MPV  


 


Neut # (Auto)  


 


Lymph # (Auto)  


 


Mono # (Auto)  


 


Eos # (Auto)  


 


Baso # (Auto)  


 


Absolute Nucleated RBC  


 


Nucleated RBC %  


 


PT  


 


INR  


 


Sodium  


 


Potassium  


 


Chloride  


 


Carbon Dioxide  


 


Anion Gap  


 


BUN  


 


Creatinine  


 


Estimated GFR (MDRD)  


 


Glucose  


 


Calcium  


 


Magnesium  


 


Total Bilirubin  


 


AST  


 


ALT  


 


Alkaline Phosphatase  


 


Total Protein  


 


Albumin  


 


Globulin  


 


Albumin/Globulin Ratio  


 


Lipase  


 


Urine Color  YELLOW 


 


Urine Clarity  CLEAR 


 


Urine pH  6.0 


 


Ur Specific Gravity  <=1.005 


 


Urine Protein  NEGATIVE 


 


Urine Glucose (UA)  NEGATIVE 


 


Urine Ketones  NEGATIVE 


 


Urine Occult Blood  NEGATIVE 


 


Urine Nitrite  NEGATIVE 


 


Urine Bilirubin  NEGATIVE 


 


Urine Urobilinogen  0.2 (NORMAL) 


 


Ur Leukocyte Esterase  TRACE H 


 


Urine RBC  0-5 


 


Urine WBC  0-3 


 


Ur Squamous Epith Cells  RARE Squamous 


 


Urine Bacteria  None Seen 


 


Ur Microscopic Review  INDICATED 


 


Urine Culture Comments  INDICATED 


 


Nasal Adenovirus (PCR)   NOT DETECTED


 


Nasal B. parapertussis DNA (PCR)   NOT DETECTED


 


Nasal Coronavir 229E PCR   NOT DETECTED


 


Nasal Coronavir HKU1 PCR   NOT DETECTED


 


Nasal Coronavir NL63 PCR   NOT DETECTED


 


Nasal Coronavir OC43 PCR   NOT DETECTED


 


Nasal Enterovir/Rhinovir PCR   NOT DETECTED


 


Nasal Influenza B PCR   NOT DETECTED


 


Nasal Influenza A PCR   NOT DETECTED


 


Nasal Parainfluen 1 PCR   NOT DETECTED


 


Nasal Parainfluen 2 PCR   NOT DETECTED


 


Nasal Parainfluen 3 PCR   NOT DETECTED


 


Nasal Parainfluen 4 PCR   NOT DETECTED


 


Nasal RSV (PCR)   NOT DETECTED


 


Nasal B.pertussis DNA PCR   NOT DETECTED


 


Nasal C.pneumoniae (PCR)   NOT DETECTED


 


Eddie Human Metapneumo PCR   NOT DETECTED


 


Nasal M.pneumoniae (PCR)   NOT DETECTED


 


Nasal SARS-CoV-2 (PCR)   NOT DETECTED


 


Ethyl Alcohol  














- Rads (name of study)


  ** chest xray


Radiology: Prelim report reviewed, See rad report





  ** CT head


Radiology: Prelim report reviewed, See rad report





PD MEDICAL DECISION MAKING





- ED course


Complexity details: reviewed results, re-evaluated patient, considered 

differential, d/w patient, d/w family (with patient's permission, I discussed 

the test results, diagnosis, and plan ([place] [facility] patient's son 

(Mario))


ED course: 





patient c/o one week of gradual onset but steadily worsening BLE weakness. She 

denies recent head injury. In discussion with her son, Mario, over the phone 

(with patient's permission), he indicates patient is an alcoholic. 


When I mention to patient the finding of several bruises on her legs, she says 

these are due to some recent falls, but she again denies any recollection of 

head injury, denies neck pain, denies back pain, and denies HA. CT head 

demonstrates findings suggestive of subacute SDH with 1.2 cm midline shift. 

Given the large area involved and this significant midline shift, she is 

transferred [place] [facility] [place] after I discussed the case with Dr. Chen (ED at [facility], 

who accepts patient for transfer)





Departure





- Departure


Disposition: 02 Transfer Acute Care Hosp


Clinical Impression: 


 Subdural hematoma





Condition: Stable


Discharge Date/Time: 08/29/21 23:38

## 2021-09-19 ENCOUNTER — HOSPITAL ENCOUNTER (EMERGENCY)
Dept: HOSPITAL 76 - ED | Age: 71
Discharge: HOME | End: 2021-09-19
Payer: MEDICARE

## 2021-09-19 VITALS — SYSTOLIC BLOOD PRESSURE: 101 MMHG | DIASTOLIC BLOOD PRESSURE: 77 MMHG

## 2021-09-19 DIAGNOSIS — F17.200: ICD-10-CM

## 2021-09-19 DIAGNOSIS — Z87.820: ICD-10-CM

## 2021-09-19 DIAGNOSIS — J44.9: ICD-10-CM

## 2021-09-19 DIAGNOSIS — I10: ICD-10-CM

## 2021-09-19 DIAGNOSIS — Z98.890: ICD-10-CM

## 2021-09-19 DIAGNOSIS — R42: Primary | ICD-10-CM

## 2021-09-19 LAB
ALBUMIN DIAFP-MCNC: 4.4 G/DL (ref 3.2–5.5)
ALBUMIN/GLOB SERPL: 1.7 {RATIO} (ref 1–2.2)
ALP SERPL-CCNC: 73 IU/L (ref 42–121)
ALT SERPL W P-5'-P-CCNC: 28 IU/L (ref 10–60)
ANION GAP SERPL CALCULATED.4IONS-SCNC: 11 MMOL/L (ref 6–13)
AST SERPL W P-5'-P-CCNC: 25 IU/L (ref 10–42)
BASOPHILS NFR BLD AUTO: 0 10^3/UL (ref 0–0.1)
BASOPHILS NFR BLD AUTO: 0.3 %
BILIRUB BLD-MCNC: 0.6 MG/DL (ref 0.2–1)
BUN SERPL-MCNC: 9 MG/DL (ref 6–20)
CALCIUM UR-MCNC: 9.2 MG/DL (ref 8.5–10.3)
CHLORIDE SERPL-SCNC: 100 MMOL/L (ref 101–111)
CO2 SERPL-SCNC: 27 MMOL/L (ref 21–32)
CREAT SERPLBLD-SCNC: 0.5 MG/DL (ref 0.4–1)
EOSINOPHIL # BLD AUTO: 0.1 10^3/UL (ref 0–0.7)
EOSINOPHIL NFR BLD AUTO: 1.8 %
ERYTHROCYTE [DISTWIDTH] IN BLOOD BY AUTOMATED COUNT: 12.1 % (ref 12–15)
GFRSERPLBLD MDRD-ARVRAT: 123 ML/MIN/{1.73_M2} (ref 89–?)
GLOBULIN SER-MCNC: 2.6 G/DL (ref 2.1–4.2)
GLUCOSE SERPL-MCNC: 101 MG/DL (ref 70–100)
HCT VFR BLD AUTO: 37.4 % (ref 37–47)
HGB UR QL STRIP: 12.3 G/DL (ref 12–16)
LIPASE SERPL-CCNC: 94 U/L (ref 22–51)
LYMPHOCYTES # SPEC AUTO: 1.7 10^3/UL (ref 1.5–3.5)
LYMPHOCYTES NFR BLD AUTO: 24.9 %
MAGNESIUM SERPL-MCNC: 2 MG/DL (ref 1.7–2.8)
MCH RBC QN AUTO: 33.2 PG (ref 27–31)
MCHC RBC AUTO-ENTMCNC: 32.9 G/DL (ref 32–36)
MCV RBC AUTO: 101.1 FL (ref 81–99)
MONOCYTES # BLD AUTO: 0.6 10^3/UL (ref 0–1)
MONOCYTES NFR BLD AUTO: 8.3 %
NEUTROPHILS # BLD AUTO: 4.4 10^3/UL (ref 1.5–6.6)
NEUTROPHILS # SNV AUTO: 6.8 X10^3/UL (ref 4.8–10.8)
NEUTROPHILS NFR BLD AUTO: 64.4 %
NRBC # BLD AUTO: 0 /100WBC
NRBC # BLD AUTO: 0 X10^3/UL
PDW BLD AUTO: 9.4 FL (ref 7.9–10.8)
PLATELET # BLD: 361 10^3/UL (ref 130–450)
POTASSIUM SERPL-SCNC: 4 MMOL/L (ref 3.5–5)
PROT SPEC-MCNC: 7 G/DL (ref 6.7–8.2)
RBC MAR: 3.7 10^6/UL (ref 4.2–5.4)
SODIUM SERPLBLD-SCNC: 138 MMOL/L (ref 135–145)

## 2021-09-19 PROCEDURE — 36415 COLL VENOUS BLD VENIPUNCTURE: CPT

## 2021-09-19 PROCEDURE — 80053 COMPREHEN METABOLIC PANEL: CPT

## 2021-09-19 PROCEDURE — 99283 EMERGENCY DEPT VISIT LOW MDM: CPT

## 2021-09-19 PROCEDURE — 83690 ASSAY OF LIPASE: CPT

## 2021-09-19 PROCEDURE — 85025 COMPLETE CBC W/AUTO DIFF WBC: CPT

## 2021-09-19 PROCEDURE — 83735 ASSAY OF MAGNESIUM: CPT

## 2021-09-19 PROCEDURE — 99284 EMERGENCY DEPT VISIT MOD MDM: CPT

## 2021-09-19 NOTE — ED PHYSICIAN DOCUMENTATION
PD HPI FOCAL NEURO





- Stated complaint


Stated Complaint: H/O TBI, DIZZY





- Chief complaint


Chief Complaint: General





- History obtained from


History obtained from: Patient





- History of Present Illness


Timing - onset: Today


Timing - duration: Hours


Timing - details: Abrupt onset, Still present


Severity of deficit: Mild


Weakness: No: Face, Arm, Leg


Numbness: No: Face, Arm, Leg


Associated symptoms: No: Headache, Nausea / vomiting


Contributing factors: negative: Anticoagulated


Baseline status: positive: A&OX3, ambulatory, indep


Similar symptoms before: Diagnosis (she had similar lightheaded though much 

worse, and with some headache, when had subdural hematoma 20 days ago. She had 

been cautioned to recheck if any symptoms, so here for eval.)


Recently seen: Emergency Dept, Surgery (had decompressive surgery for subdural 

hematoma 20 days ago.), Transferred





Review of Systems


Constitutional: denies: Fever, Chills


Nose: denies: Rhinorrhea / runny nose, Congestion


Throat: denies: Sore throat


Cardiac: denies: Chest pain / pressure


Respiratory: denies: Dyspnea, Cough


GI: denies: Abdominal Pain, Nausea, Vomiting, Diarrhea


Skin: denies: Rash, Lesions


Neurologic: denies: Focal weakness, Numbness, Confused





PD PAST MEDICAL HISTORY





- Past Medical History


Cardiovascular: High cholesterol, Hypertension


Respiratory: COPD


Neuro: CVA


Endocrine/Autoimmune: None


GI: Diverticulitis


GYN: None


: None


HEENT: Other


Psych: None


Musculoskeletal: None


Derm: None





- Past Surgical History


Past Surgical History: Yes


General: Appendectomy


/GYN:  section


HEENT: Cataracts, Tonsil/Adenoidectomy





- Present Medications


Home Medications: 


                                Ambulatory Orders











 Medication  Instructions  Recorded  Confirmed


 


Ipratropium/Albuterol [Duoneb] 1 puffs PO BID 18


 


Atorvastatin [Lipitor] 80 mg PO DAILY 20


 


Metoprolol Succinate [Toprol Xl] 50 mg PO DAILY 20


 


lisinopriL [Prinivil] 10 mg PO BID 20


 


Levetiracetam [Keppra] 500 mg PO BID #60 tablet 21


 


Ipratropium/Albuterol [Duoneb] 3 ml INH Q6H PRN #30 unit 21 


 


Aspirin [[place] Aspirin] 1 tab DAILY 21


 


Atorvastatin Calcium [Lipitor] 1 tab DAILY 21


 


Levetiracetam [Keppra] 0.5 tab BID 21


 


Lisinopril [Zestril] 1 tab BID 21


 


Metoprolol Succinate [Toprol Xl] 1 tab DAILY 21














- Allergies


Allergies/Adverse Reactions: 


                                    Allergies











Allergy/AdvReac Type Severity Reaction Status Date / Time


 


doxycycline Allergy  Unknown Verified 21 11:22


 


erythromycin base Allergy  Unknown Verified 21 11:22














- Social History


Does the pt smoke?: Yes


Smoking Status: Current every day smoker


Does the pt drink ETOH?: Yes


Does the pt have substance abuse?: No





- Immunizations


Immunizations are current?: Yes





- POLST


Patient has POLST: No





PD ED PE NORMAL





- Vitals


Vital signs reviewed: Yes





- General


General: Alert and oriented X 3, No acute distress, Well developed/nourished





- Neck


Neck: Supple, no meningeal sign, No adenopathy





- Cardiac


Cardiac: RRR, No murmur





- Respiratory


Respiratory: Clear bilaterally





- Abdomen


Abdomen: Soft, Non tender





- Derm


Derm: Normal color, Warm and dry





- Extremities


Extremities: Normal ROM s pain





- Neuro


Neuro: Alert and oriented X 3, CNs 2-12 intact, No motor deficit, No sensory 

deficit, Normal speech





Results





- Vitals


Vitals: 


                                     Oxygen











O2 Source                      Room air

















- Labs


Labs: 


                                Laboratory Tests











  21





  12:52 12:52


 


WBC  6.8 


 


RBC  3.70 L 


 


Hgb  12.3 


 


Hct  37.4 


 


MCV  101.1 H 


 


MCH  33.2 H 


 


MCHC  32.9 


 


RDW  12.1 


 


Plt Count  361 


 


MPV  9.4 


 


Neut # (Auto)  4.4 


 


Lymph # (Auto)  1.7 


 


Mono # (Auto)  0.6 


 


Eos # (Auto)  0.1 


 


Baso # (Auto)  0.0 


 


Absolute Nucleated RBC  0.00 


 


Nucleated RBC %  0.0 


 


Sodium   138


 


Potassium   4.0


 


Chloride   100 L


 


Carbon Dioxide   27


 


Anion Gap   11.0


 


BUN   9


 


Creatinine   0.5


 


Estimated GFR (MDRD)   123


 


Glucose   101 H


 


Calcium   9.2


 


Magnesium   2.0


 


Total Bilirubin   0.6


 


AST   25


 


ALT   28


 


Alkaline Phosphatase   73


 


Total Protein   7.0


 


Albumin   4.4


 


Globulin   2.6


 


Albumin/Globulin Ratio   1.7


 


Lipase   94 H














- Rads (name of study)


  ** head CT


Radiology: Prelim report reviewed (findings c/w remote subdural, without new 

bleeding. ), See rad report





PD MEDICAL DECISION MAKING





- ED course


Complexity details: reviewed results, considered differential (recent TBI with 

subdural and surgery decompressive. Having lightheaded feeling. Can check CT to 

ensure not recurrent bleed. Also to check labs to ensure not low sodium/etc. ), 

d/w patient





Departure





- Departure


Disposition: 01 Home, Self Care


Clinical Impression: 


 Light-headed feeling





Condition: Stable


Record reviewed to determine appropriate education?: Yes


Comments: 


Your CT scan does not show any acute rebleeding or swelling.  It appears 

consistent with your recent surgery.  Your basic blood tests are normal with a 

normal sodium and electrolytes and blood sugar.





Your lightheaded feeling may be related to under hydration.  Be sure to eat and 

drink well through the day.





See how you feel over the next couple of days and if you develop any other 

symptoms such as congestion cough fevers etc.  That may account for some of your

early symptoms now instead.





Continue usual medications.  Return if worsening.


Discharge Date/Time: 21 13:59

## 2021-09-19 NOTE — CT REPORT
PROCEDURE:  HEAD WO

 

INDICATIONS:  lightheaded/dizzy today; s/p SDH with surgery

 

TECHNIQUE:  

Noncontrast 4.5 mm thick angled axial sections acquired from the foramen magnum to the vertex.  For r
adiation dose reduction, the following was used:  automated exposure control, adjustment of mA and/or
 kV according to patient size.

 

COMPARISON:  3/21/2021, 1/4/2021

 

FINDINGS:  

Image quality:  Excellent.  

 

CSF spaces:  Basal cisterns are patent. The right lateral ventricle is smaller than the left. Brain: 
 Right-sided subdural hemorrhage is seen, with a maximum thickness of 13 mm. There is mixed density w
ithin this hemorrhage, yet without cadence acute components.

 

There is 2-3 mm of midline shift.  No intracranial masses.  Gray-white matter interface is normal.  

 

Encephalomalacia with remote infarct can be seen involving the posterior aspect of the right cerebral
 hemisphere involving the occipital region.

 

Skull and face:  Right-sided khoi holes are seen. Calvarium and visualized facial bones are intact, w
ithout suspicious lesions.  

 

Sinuses:  Visualized sinuses and mastoids are clear.  

 

 

IMPRESSION:  Known right subdural hemorrhage, without cadence acute hemorrhage.

 

If clinically appropriate, please consider short-term follow-up head CT.

 

Mass effect is seen, with narrowing of the right lateral ventricle and 2-3 mm midline shift.

 

Right-sided khoi holes are seen.

 

Remote right posterior cerebral hemisphere infarction.

 

Reviewed by: Hank Mckeon MD on 9/19/2021 11:44 AM GURPREET

Approved by: Hank Mckeon MD on 9/19/2021 11:44 AM GURPREET

 

 

Station ID:  [id number]

## 2021-12-09 ENCOUNTER — HOSPITAL ENCOUNTER (OUTPATIENT)
Dept: HOSPITAL 76 - DI | Age: 71
Discharge: HOME | End: 2021-12-09
Attending: NEUROLOGICAL SURGERY
Payer: MEDICARE

## 2021-12-09 DIAGNOSIS — S06.5X9A: Primary | ICD-10-CM

## 2021-12-09 NOTE — CT REPORT
PROCEDURE:  HEAD WO

 

INDICATIONS:  SUBDURAL HEMATOMA

 

TECHNIQUE:  

Noncontrast 4.5 mm thick angled axial sections acquired from the foramen magnum to the vertex.  For r
adiation dose reduction, the following was used:  automated exposure control, adjustment of mA and/or
 kV according to patient size.

 

COMPARISON:  9/19/2021, 3/21/2021

 

FINDINGS:  

Image quality:  Excellent.  

 

CSF spaces:  Basal cisterns are patent.  No extra-axial fluid collections.  Ventricles are normal in 
size and shape.  

 

Brain:  There is a small amount of right subdural hemorrhage seen. The hemorrhage is primarily dense 
and measures 3 mm in thickness. This is clearly improved compared to the 9/19/2021 examination.

 

Volume loss is seen involving the inferior occipital/posterior temporal region, as before.

 

No midline shift.  No intracranial masses.  Gray-white matter interface is normal.  

 

Skull and face:  Right-sided khoi holes are seen. Calvarium and visualized facial bones are intact, w
ithout suspicious lesions.  

 

Sinuses:  Visualized sinuses and mastoids are clear.  

 

 

 

IMPRESSION:  The right-sided subdural hematoma is clearly improved compared to 9/19/2021. However, th
ere is a 3 mm dense component seen, which likely represents interval hemorrhage.

 

Right-sided khoi holes are seen.

 

Stable right temporal occipital infarction.

 

 

 

Note: Findings of likely interval subdural hematoma discussed by telephone with nurse Sabino at 1:20 
PM [place] time on 12/9/2021.

 

 

Reviewed by: Hank Mckeon MD on 12/9/2021 1:57 PM AKST

Approved by: Hank Mckeon MD on 12/9/2021 1:57 PM AKST

 

 

Station ID:  [id number]

## 2022-02-11 ENCOUNTER — HOSPITAL ENCOUNTER (OUTPATIENT)
Dept: HOSPITAL 76 - EMS | Age: 72
End: 2022-02-11
Payer: MEDICARE

## 2022-02-11 ENCOUNTER — HOSPITAL ENCOUNTER (EMERGENCY)
Dept: HOSPITAL 76 - ED | Age: 72
Discharge: HOME | End: 2022-02-11
Payer: MEDICARE

## 2022-02-11 VITALS — SYSTOLIC BLOOD PRESSURE: 133 MMHG | DIASTOLIC BLOOD PRESSURE: 84 MMHG

## 2022-02-11 DIAGNOSIS — F17.200: ICD-10-CM

## 2022-02-11 DIAGNOSIS — R20.2: Primary | ICD-10-CM

## 2022-02-11 DIAGNOSIS — R11.0: ICD-10-CM

## 2022-02-11 DIAGNOSIS — R20.0: ICD-10-CM

## 2022-02-11 DIAGNOSIS — Z87.820: ICD-10-CM

## 2022-02-11 DIAGNOSIS — I25.2: ICD-10-CM

## 2022-02-11 DIAGNOSIS — I10: ICD-10-CM

## 2022-02-11 DIAGNOSIS — R53.1: Primary | ICD-10-CM

## 2022-02-11 DIAGNOSIS — G40.909: ICD-10-CM

## 2022-02-11 LAB
ALBUMIN DIAFP-MCNC: 3.9 G/DL (ref 3.2–5.5)
ALBUMIN/GLOB SERPL: 1.4 {RATIO} (ref 1–2.2)
ALP SERPL-CCNC: 43 IU/L (ref 42–121)
ALT SERPL W P-5'-P-CCNC: 15 IU/L (ref 10–60)
ANION GAP SERPL CALCULATED.4IONS-SCNC: 12 MMOL/L (ref 6–13)
AST SERPL W P-5'-P-CCNC: 20 IU/L (ref 10–42)
BASOPHILS NFR BLD AUTO: 0 10^3/UL (ref 0–0.1)
BASOPHILS NFR BLD AUTO: 0.5 %
BILIRUB BLD-MCNC: 1.1 MG/DL (ref 0.2–1)
BUN SERPL-MCNC: 13 MG/DL (ref 6–20)
CALCIUM UR-MCNC: 8.9 MG/DL (ref 8.5–10.3)
CHLORIDE SERPL-SCNC: 97 MMOL/L (ref 101–111)
CLARITY UR REFRACT.AUTO: CLEAR
CO2 SERPL-SCNC: 24 MMOL/L (ref 21–32)
CREAT SERPLBLD-SCNC: 0.5 MG/DL (ref 0.4–1)
EOSINOPHIL # BLD AUTO: 0 10^3/UL (ref 0–0.7)
EOSINOPHIL NFR BLD AUTO: 0.3 %
ERYTHROCYTE [DISTWIDTH] IN BLOOD BY AUTOMATED COUNT: 13.8 % (ref 12–15)
GFRSERPLBLD MDRD-ARVRAT: 122 ML/MIN/{1.73_M2} (ref 89–?)
GLOBULIN SER-MCNC: 2.8 G/DL (ref 2.1–4.2)
GLUCOSE SERPL-MCNC: 95 MG/DL (ref 70–100)
GLUCOSE UR QL STRIP.AUTO: 100 MG/DL
HCT VFR BLD AUTO: 36.2 % (ref 37–47)
HGB UR QL STRIP: 12.3 G/DL (ref 12–16)
KETONES UR QL STRIP.AUTO: (no result) MG/DL
LIPASE SERPL-CCNC: 48 U/L (ref 22–51)
LYMPHOCYTES # SPEC AUTO: 1.3 10^3/UL (ref 1.5–3.5)
LYMPHOCYTES NFR BLD AUTO: 17.1 %
MCH RBC QN AUTO: 34.3 PG (ref 27–31)
MCHC RBC AUTO-ENTMCNC: 34 G/DL (ref 32–36)
MCV RBC AUTO: 100.8 FL (ref 81–99)
MONOCYTES # BLD AUTO: 0.7 10^3/UL (ref 0–1)
MONOCYTES NFR BLD AUTO: 9.3 %
NEUTROPHILS # BLD AUTO: 5.3 10^3/UL (ref 1.5–6.6)
NEUTROPHILS # SNV AUTO: 7.4 X10^3/UL (ref 4.8–10.8)
NEUTROPHILS NFR BLD AUTO: 72.5 %
NITRITE UR QL STRIP.AUTO: NEGATIVE
NRBC # BLD AUTO: 0 /100WBC
NRBC # BLD AUTO: 0 X10^3/UL
PDW BLD AUTO: 9 FL (ref 7.9–10.8)
PH UR STRIP.AUTO: 5.5 PH (ref 5–7.5)
PLATELET # BLD: 288 10^3/UL (ref 130–450)
POTASSIUM SERPL-SCNC: 3.6 MMOL/L (ref 3.5–5)
PROT SPEC-MCNC: 6.7 G/DL (ref 6.7–8.2)
PROT UR STRIP.AUTO-MCNC: (no result) MG/DL
RBC # UR STRIP.AUTO: NEGATIVE /UL
RBC MAR: 3.59 10^6/UL (ref 4.2–5.4)
SODIUM SERPLBLD-SCNC: 133 MMOL/L (ref 135–145)
SP GR UR STRIP.AUTO: >=1.03 (ref 1–1.03)
UROBILINOGEN UR QL STRIP.AUTO: (no result) E.U./DL
UROBILINOGEN UR STRIP.AUTO-MCNC: NEGATIVE MG/DL

## 2022-02-11 PROCEDURE — 83690 ASSAY OF LIPASE: CPT

## 2022-02-11 PROCEDURE — 85025 COMPLETE CBC W/AUTO DIFF WBC: CPT

## 2022-02-11 PROCEDURE — 99283 EMERGENCY DEPT VISIT LOW MDM: CPT

## 2022-02-11 PROCEDURE — 36415 COLL VENOUS BLD VENIPUNCTURE: CPT

## 2022-02-11 PROCEDURE — 81003 URINALYSIS AUTO W/O SCOPE: CPT

## 2022-02-11 PROCEDURE — 80053 COMPREHEN METABOLIC PANEL: CPT

## 2022-02-11 PROCEDURE — 81001 URINALYSIS AUTO W/SCOPE: CPT

## 2022-02-11 PROCEDURE — 99284 EMERGENCY DEPT VISIT MOD MDM: CPT

## 2022-02-11 PROCEDURE — 87086 URINE CULTURE/COLONY COUNT: CPT

## 2022-02-11 PROCEDURE — 93005 ELECTROCARDIOGRAM TRACING: CPT

## 2022-02-11 PROCEDURE — 84484 ASSAY OF TROPONIN QUANT: CPT

## 2022-02-11 NOTE — ED PHYSICIAN DOCUMENTATION
History of Present Illness





- Stated complaint


Stated Complaint: ABD PAIN/CHILLS





- Chief complaint


Chief Complaint: Abd Pain





- Additonal information


Additional information: 





71-year-old female who has a history of hypertension, seizure disorder, previous

myocardial infarction as well as traumatic brain injury presents the emergency 

department for evaluation of a generalized tingling sensation.  She reports that

it starts in her epigastric area and spreads out like a spiderweb.  She then 

will have sweating of her palms and feet.  She believes it is a panic attack.  

She states that this has been intermittent for a while.  She has tried previous 

antianxiety meds without relief.





She has no chest pain.  No changes in shortness of breath.  No focal weakness.





Review of Systems


Constitutional: denies: Fever, Chills


Eyes: reports: Reviewed and negative


Ears: reports: Reviewed and negative


Throat: reports: Reviewed and negative


Cardiac: reports: Reviewed and negative


Respiratory: denies: Dyspnea, Cough


GI: reports: Reviewed and negative


: reports: Reviewed and negative


Skin: reports: Reviewed and negative


Musculoskeletal: reports: Reviewed and negative


Neurologic: reports: Other (Numbness and tingling in extremities).  denies: 

Generalized weakness, Focal weakness, Difficulty speaking, Near syncope, 

Syncope, Confused, Headache, Head injury, LOC





PD PAST MEDICAL HISTORY





- Past Medical History


Cardiovascular: High cholesterol, Hypertension


Respiratory: COPD


Neuro: CVA


Endocrine/Autoimmune: None


GI: Diverticulitis


GYN: None


: None


HEENT: Other


Psych: None


Musculoskeletal: None


Derm: None





- Past Surgical History


Past Surgical History: Yes


General: Appendectomy


/GYN:  section


HEENT: Cataracts, Tonsil/Adenoidectomy





- Present Medications


Home Medications: 


                                Ambulatory Orders











 Medication  Instructions  Recorded  Confirmed


 


Ipratropium/Albuterol [Duoneb] 1 puffs PO BID 18


 


Atorvastatin [Lipitor] 80 mg PO DAILY 20


 


Metoprolol Succinate [Toprol Xl] 50 mg PO DAILY 20


 


lisinopriL [Prinivil] 10 mg PO BID 20


 


Levetiracetam [Keppra] 500 mg PO BID #60 tablet 21


 


Ipratropium/Albuterol [Duoneb] 3 ml INH Q6H PRN #30 unit 21 


 


Aspirin [Kraemer Aspirin] 1 tab DAILY 21


 


Atorvastatin Calcium [Lipitor] 1 tab DAILY 21


 


Levetiracetam [Keppra] 0.5 tab BID 21


 


Lisinopril [Zestril] 1 tab BID 21


 


Metoprolol Succinate [Toprol Xl] 1 tab DAILY 21


 


hydrOXYzine HCL [Hydroxyzine HCl] 25 mg PO BID PRN #20 tablet 22 














- Allergies


Allergies/Adverse Reactions: 


                                    Allergies











Allergy/AdvReac Type Severity Reaction Status Date / Time


 


doxycycline Allergy  Unknown Verified 22 14:59


 


erythromycin base Allergy  Unknown Verified 22 14:59














- Social History


Does the pt smoke?: Yes


Smoking Status: Current every day smoker


Does the pt drink ETOH?: Yes


Does the pt have substance abuse?: No





- Immunizations


Immunizations are current?: Yes





- POLST


Patient has POLST: No





PD ED PE NORMAL





- General


General: Alert and oriented X 3, No acute distress, Well developed/nourished, 

Other (Anxious appearing)





- HEENT


HEENT: Atraumatic, Moist mucous membranes, Pharynx benign





- Neck


Neck: Supple, no meningeal sign, No adenopathy





- Cardiac


Cardiac: RRR, No murmur, No gallop





- Respiratory


Respiratory: No respiratory distress, Clear bilaterally





- Abdomen


Abdomen: Normal bowel sounds, Soft, Non tender





- Back


Back: No CVA TTP, No spinal TTP





- Derm


Derm: Normal color, Warm and dry, No rash





- Extremities


Extremities: No deformity, No tenderness to palpate, Normal ROM s pain





- Neuro


Neuro: Alert and oriented X 3, CNs 2-12 intact


Eye Opening: Spontaneous


Motor: Obeys Commands


Verbal: Oriented


GCS Score: 15





Results





- Vitals


Vitals: 


                               Vital Signs - 24 hr











  22





  14:54 14:59 17:00


 


Temperature 36.4 C L 36.4 C L 


 


Heart Rate 92 92 88


 


Respiratory 18 18 17





Rate   


 


Blood Pressure 142/68 H 142/68 H 133/84 H


 


O2 Saturation 97 97 99








                                     Oxygen











O2 Source                      Room air

















- EKG (time done)


  ** 1553


Rate: Rate (enter#) (82)


Rhythm: NSR


Axis: Normal


Intervals: No: Prolonged QT


QRS: Normal


Ischemia: Q waves (V1V2)


Compare to prior EKG: Unchanged from prior EKG


Computer interpretation: Agree with computer





- Labs


Labs: 


                                Laboratory Tests











  22





  15:10 16:08 16:08


 


WBC   7.4 


 


RBC   3.59 L 


 


Hgb   12.3 


 


Hct   36.2 L 


 


MCV   100.8 H 


 


MCH   34.3 H 


 


MCHC   34.0 


 


RDW   13.8 


 


Plt Count   288 


 


MPV   9.0 


 


Neut # (Auto)   5.3 


 


Lymph # (Auto)   1.3 L 


 


Mono # (Auto)   0.7 


 


Eos # (Auto)   0.0 


 


Baso # (Auto)   0.0 


 


Absolute Nucleated RBC   0.00 


 


Nucleated RBC %   0.0 


 


Sodium    133 L


 


Potassium    3.6


 


Chloride    97 L


 


Carbon Dioxide    24


 


Anion Gap    12.0


 


BUN    13


 


Creatinine    0.5


 


Estimated GFR (MDRD)    122


 


Glucose    95


 


Calcium    8.9


 


Total Bilirubin    1.1 H


 


AST    20


 


ALT    15


 


Alkaline Phosphatase    43


 


Troponin I High Sens   


 


Total Protein    6.7


 


Albumin    3.9


 


Globulin    2.8


 


Albumin/Globulin Ratio    1.4


 


Lipase    48


 


Urine Color  DARK YELLOW  


 


Urine Clarity  CLEAR  


 


Urine pH  5.5  


 


Ur Specific Gravity  >=1.030 H  


 


Urine Protein  TRACE  


 


Urine Glucose (UA)  100 H  


 


Urine Ketones  TRACE  


 


Urine Occult Blood  NEGATIVE  


 


Urine Nitrite  NEGATIVE  


 


Urine Bilirubin  NEGATIVE  


 


Urine Urobilinogen  0.2 (NORMAL)  


 


Ur Leukocyte Esterase  NEGATIVE  


 


Ur Microscopic Review  NOT INDICATED  


 


Urine Culture Comments  NOT INDICATED  














  22





  16:08


 


WBC 


 


RBC 


 


Hgb 


 


Hct 


 


MCV 


 


MCH 


 


MCHC 


 


RDW 


 


Plt Count 


 


MPV 


 


Neut # (Auto) 


 


Lymph # (Auto) 


 


Mono # (Auto) 


 


Eos # (Auto) 


 


Baso # (Auto) 


 


Absolute Nucleated RBC 


 


Nucleated RBC % 


 


Sodium 


 


Potassium 


 


Chloride 


 


Carbon Dioxide 


 


Anion Gap 


 


BUN 


 


Creatinine 


 


Estimated GFR (MDRD) 


 


Glucose 


 


Calcium 


 


Total Bilirubin 


 


AST 


 


ALT 


 


Alkaline Phosphatase 


 


Troponin I High Sens  4.7


 


Total Protein 


 


Albumin 


 


Globulin 


 


Albumin/Globulin Ratio 


 


Lipase 


 


Urine Color 


 


Urine Clarity 


 


Urine pH 


 


Ur Specific Gravity 


 


Urine Protein 


 


Urine Glucose (UA) 


 


Urine Ketones 


 


Urine Occult Blood 


 


Urine Nitrite 


 


Urine Bilirubin 


 


Urine Urobilinogen 


 


Ur Leukocyte Esterase 


 


Ur Microscopic Review 


 


Urine Culture Comments 














PD MEDICAL DECISION MAKING





- ED course


Complexity details: reviewed results, re-evaluated patient, considered 

differential, d/w patient, d/w family


ED course: 





71-year-old female who has a history of coronary artery disease, previous trauma

tic brain injury as well as COPD presents the emergency department for a 

generalized feeling of uneasiness.  She starts a tingling sensation in her 

abdomen that then extends to her arms and legs and she becomes sweaty.  She 

denies that she is having chest pain and is worried that she could be having a 

panic attack but with her medical history wanted to be evaluated for cardiac 

causes.





Screening EKG is unchanged and nonischemic.  Troponin is negative.  Otherwise 

screening labs are unremarkable.  Patient does not have any focal neuro signs 

low suspicion for a CVA.





I did prescribe a small amount of hydroxyzine.  I did advise her to have close 

follow-up with her primary care provider to discuss longer-term management of 

likely anxiety related symptoms.  Emergent return precautions were discussed.





Departure





- Departure


Disposition: 01 Home, Self Care


Clinical Impression: 


 Tingling in extremities





Condition: Stable


Record reviewed to determine appropriate education?: Yes


Prescriptions: 


hydrOXYzine HCL [Hydroxyzine HCl] 25 mg PO BID PRN #20 tablet


 PRN Reason: Anxiety


Comments: 


Daren sheriff were seen today in the emergency department for tingling in your arms 

and legs that starts in your stomach area.





Your screening labs, EKG do not show any worrisome findings.  It does not 

suggest that you are having a heart attack.





You do describe to me a lot of stress happening in your life and it is possible 

that the symptoms are due to anxiety.





Please discuss this ED visit with your primary care doctor.  I am prescribing a 

small amount of the medication called hydroxyzine that you can take once or 

twice a day for anxiety.  In general we do recommend that when feeling anxious 

you take slow deep breaths and try and calm yourself but your primary doctor may

want to write a prescription for a different kind of medication.





If you ever have any fainting episodes, sudden severe chest pain or shortness of

breath then please return immediately to the ER for a second evaluation

## 2022-07-18 ENCOUNTER — HOSPITAL ENCOUNTER (OUTPATIENT)
Dept: HOSPITAL 76 - EMS | Age: 72
End: 2022-07-18
Payer: MEDICARE

## 2022-07-18 DIAGNOSIS — R06.02: Primary | ICD-10-CM
